# Patient Record
Sex: FEMALE
[De-identification: names, ages, dates, MRNs, and addresses within clinical notes are randomized per-mention and may not be internally consistent; named-entity substitution may affect disease eponyms.]

---

## 2024-06-14 ENCOUNTER — APPOINTMENT (OUTPATIENT)
Dept: ORTHOPEDIC SURGERY | Facility: CLINIC | Age: 79
End: 2024-06-14
Payer: MEDICARE

## 2024-06-14 DIAGNOSIS — M48.062 SPINAL STENOSIS, LUMBAR REGION WITH NEUROGENIC CLAUDICATION: ICD-10-CM

## 2024-06-14 DIAGNOSIS — M54.16 RADICULOPATHY, LUMBAR REGION: ICD-10-CM

## 2024-06-14 DIAGNOSIS — M43.16 SPONDYLOLISTHESIS, LUMBAR REGION: ICD-10-CM

## 2024-06-14 DIAGNOSIS — Z86.39 PERSONAL HISTORY OF OTHER ENDOCRINE, NUTRITIONAL AND METABOLIC DISEASE: ICD-10-CM

## 2024-06-14 PROBLEM — Z00.00 ENCOUNTER FOR PREVENTIVE HEALTH EXAMINATION: Status: ACTIVE | Noted: 2024-06-14

## 2024-06-14 PROCEDURE — 72110 X-RAY EXAM L-2 SPINE 4/>VWS: CPT

## 2024-06-14 PROCEDURE — 99204 OFFICE O/P NEW MOD 45 MIN: CPT

## 2024-06-14 NOTE — ASSESSMENT
[FreeTextEntry1] : 80 y/o female presenting with lower back pain x 2 years. She is referred by Dr. Li. Her pain radiates down her right leg and is constant. She also feels weaker in her right leg. She has been taking gabapentin 600mg BID without relief. She also has tried PT. She had multiple epidural and trigger point injections without relief.  She denies recent illness, fevers, numbness, saddle anesthesia, urinary retention or fecal incontinence. I independently reviewed lumbar MRI which showed severe spinal stenosis and L3-4 and L4-L5 Spondylolisthesis. We discussed treatment options including physical therapy, medications, spinal injections and surgery. Surgery would be a lumbar decompression and fusion. We discussed the risks and benefits. The risks include anesthesia, blood loss, need for blood transfusion, clots, stroke, myocardial infarct, chronic pain, loss of function, need for reoperation, nonunion, hardware failure, durotomy, infection and damage to neurovascular structures. The patient understands the risks. She asked several great questions all of which were answered. She elects to proceed with surgery.  She will be sent for an updated MRI for surgical planning.  We discussed red flag symptoms that would require emergent evaluation. She knows to call with any questions or concerns or if her symptoms acutely worsen.

## 2024-06-14 NOTE — HISTORY OF PRESENT ILLNESS
[de-identified] : 78 y/o female presenting with lower back pain x 2 years. Her pain radiates down her right leg and is constant. She also feels weaker in her right leg. She has been taking gabapentin 600mg BID without relief. She also has tried PT. She had multiple epidural and trigger point injections without relief.  She denies recent illness, fevers, numbness,  saddle anesthesia, urinary retention or fecal incontinence. She has MRI. She is accompanied by her grandson.

## 2024-06-14 NOTE — PHYSICAL EXAM
[de-identified] :  General: No acute distress, conversant, well-nourished. Head: Normocephalic, atraumatic Neck: trachea midline, FROM Heart: normotensive and normal rate and rhythm Lungs: No labored breathing Skin: No abrasions, no rashes, no edema Psych: Alert and oriented to person, place and time Extremities: no peripheral edema or digital cyanosis Gait: Normal gait. Can perform tandem gait.  Vascular: warm and well perfused distally, palpable distal pulses  MSK: Lumbar spine: No tenderness to palpation.  No step-off, no deformity.   NEURO EXAM: Sensation Left L2  -  2/2            Left L3  -  2/2 Left L4  -  2/2 Left L5  -  2/2 Left S1  -  2/2   Right L2  -  2/2            Right L3  -  2/2 Right L4  -  2/2 Right L5  -  2/2 Right S1  -  2/2   Motor: Left L2 (hip flexion)                            5/5                Left L3 (knee extension)                   5/5                Left L4 (ankle dorsiflexion)                 5/5                Left L5 (long toe extensor)                5/5                Left S1 (ankle plantar flexion)           5/5   Right L2 (hip flexion)                            4/5                Right L3 (knee extension)                   4/5                Right L4 (ankle dorsiflexion)                 5/5                Right L5 (long toe extensor)                5/5                Right S1 (ankle plantar flexion)           5/5   Reflexes: Normal and symmetric Negative clonus.  Down-going Babinski. [de-identified] : I ordered radiographs to evaluate the patient's symptoms.  Lumbar 4 view radiographs taken in the office today show no dislocation or fracture.  Lumbar spondylosis.  L3-4 and L4-L5 Spondylolisthesis.  I independently reviewed lumbar MRI which showed severe spinal stenosis and L3-4 and L4-L5 Spondylolisthesis.

## 2024-06-22 ENCOUNTER — NON-APPOINTMENT (OUTPATIENT)
Age: 79
End: 2024-06-22

## 2024-07-03 RX ORDER — POVIDONE-IODINE
1 FLAKES (GRAM) MISCELLANEOUS ONCE
Refills: 0 | Status: COMPLETED | OUTPATIENT
Start: 2024-07-08 | End: 2024-07-08

## 2024-07-03 NOTE — H&P ADULT - PROBLEM SELECTOR PLAN 1
Admit to Orthopaedic Service.  Presents today for elective L3-L5 TLIF  Pt medically stable and cleared for procedure today by Dr. Persaud

## 2024-07-03 NOTE — H&P ADULT - HISTORY OF PRESENT ILLNESS
79F c/o back pain x       Present for elective L3-L5 Transforaminal Lumbar Interbody Fusion  79F c/o back pain x chronic. Pt denies acute preceding trauma/injury. Pt has failed conservative management for her low back pain including multiple spinal epidurals (last one in 2023) and trigger point injections. She denies numbness/tingling of bilateral lower extremities. She uses a walker to get around at baseline. Denies DVT hx; denies CP, SOB, N/V, tactile fevers, calf pain.       Present for elective L3-L5 Transforaminal Lumbar Interbody Fusion     Grandson at bedside providing Danish translation

## 2024-07-03 NOTE — H&P ADULT - NSHPPHYSICALEXAM_GEN_ALL_CORE
MSK:  + decreased ROM 2/2 pain, lumbar spine       Remainder of exam per medical clearance note MSK:  + decreased ROM 2/2 pain, lumbar spine   Skin warm and well perfused, no visible wounds/erythema/ecchymoses  EHL/FHL/TA/GS 5/5 motor strength bilateral lower extremities   SLT in tact to distal bilateral lower extremities   DP pulses palpable bilaterally   Remainder of exam per medical clearance note

## 2024-07-03 NOTE — H&P ADULT - NSHPLABSRESULTS_GEN_ALL_CORE
Preop CBC, BMP, PT/PTT/INR  - WNL per medical clearance   H/H 10.5/33.3  Cr .79  Preop EKG - sinus rhythm - WNL per medical clearance    DOS

## 2024-07-05 VITALS
TEMPERATURE: 98 F | RESPIRATION RATE: 16 BRPM | HEIGHT: 59 IN | DIASTOLIC BLOOD PRESSURE: 71 MMHG | WEIGHT: 148.59 LBS | HEART RATE: 69 BPM | OXYGEN SATURATION: 99 % | SYSTOLIC BLOOD PRESSURE: 153 MMHG

## 2024-07-05 RX ORDER — SEMAGLUTIDE 1.34 MG/ML
1 INJECTION, SOLUTION SUBCUTANEOUS
Refills: 0 | DISCHARGE

## 2024-07-05 RX ORDER — EMPAGLIFLOZIN 25 MG/1
1 TABLET, FILM COATED ORAL
Refills: 0 | DISCHARGE

## 2024-07-05 RX ORDER — ASPIRIN 325 MG/1
1 TABLET, FILM COATED ORAL
Refills: 0 | DISCHARGE

## 2024-07-05 NOTE — ASU PATIENT PROFILE, ADULT - NSICDXPASTMEDICALHX_GEN_ALL_CORE_FT
PAST MEDICAL HISTORY:  Back pain     DM (diabetes mellitus)     HLD (hyperlipidemia)     HTN (hypertension)      PAST MEDICAL HISTORY:  Back pain     CAD (coronary artery disease)     DM (diabetes mellitus)     HLD (hyperlipidemia)     HTN (hypertension)

## 2024-07-05 NOTE — ASU PATIENT PROFILE, ADULT - NSICDXPASTSURGICALHX_GEN_ALL_CORE_FT
PAST SURGICAL HISTORY:  H/O: hysterectomy      PAST SURGICAL HISTORY:  Coronary stent patent x3 2017    H/O: hysterectomy

## 2024-07-05 NOTE — ASU PATIENT PROFILE, ADULT - FALL HARM RISK - HARM RISK INTERVENTIONS

## 2024-07-07 ENCOUNTER — TRANSCRIPTION ENCOUNTER (OUTPATIENT)
Age: 79
End: 2024-07-07

## 2024-07-08 ENCOUNTER — INPATIENT (INPATIENT)
Facility: HOSPITAL | Age: 79
LOS: 1 days | Discharge: HOME | End: 2024-07-10
Attending: STUDENT IN AN ORGANIZED HEALTH CARE EDUCATION/TRAINING PROGRAM | Admitting: STUDENT IN AN ORGANIZED HEALTH CARE EDUCATION/TRAINING PROGRAM
Payer: MEDICARE

## 2024-07-08 ENCOUNTER — APPOINTMENT (OUTPATIENT)
Dept: ORTHOPEDIC SURGERY | Facility: HOSPITAL | Age: 79
End: 2024-07-08

## 2024-07-08 DIAGNOSIS — Z95.5 PRESENCE OF CORONARY ANGIOPLASTY IMPLANT AND GRAFT: Chronic | ICD-10-CM

## 2024-07-08 DIAGNOSIS — Z90.710 ACQUIRED ABSENCE OF BOTH CERVIX AND UTERUS: Chronic | ICD-10-CM

## 2024-07-08 DIAGNOSIS — M54.9 DORSALGIA, UNSPECIFIED: ICD-10-CM

## 2024-07-08 LAB
BASE EXCESS BLDA CALC-SCNC: -2.9 MMOL/L — LOW (ref -2–3)
BASE EXCESS BLDA CALC-SCNC: -3.8 MMOL/L — LOW (ref -2–3)
BASE EXCESS BLDA CALC-SCNC: -5 MMOL/L — LOW (ref -2–3)
BLD GP AB SCN SERPL QL: NEGATIVE — SIGNIFICANT CHANGE UP
CA-I BLDA-SCNC: 1.17 MMOL/L — SIGNIFICANT CHANGE UP (ref 1.15–1.33)
CA-I BLDA-SCNC: 1.25 MMOL/L — SIGNIFICANT CHANGE UP (ref 1.15–1.33)
CA-I BLDA-SCNC: 1.27 MMOL/L — SIGNIFICANT CHANGE UP (ref 1.15–1.33)
CO2 BLDA-SCNC: 20 MMOL/L — SIGNIFICANT CHANGE UP (ref 19–24)
CO2 BLDA-SCNC: 22 MMOL/L — SIGNIFICANT CHANGE UP (ref 19–24)
CO2 BLDA-SCNC: 23 MMOL/L — SIGNIFICANT CHANGE UP (ref 19–24)
COHGB MFR BLDA: 1 % — SIGNIFICANT CHANGE UP
COHGB MFR BLDA: 1.1 % — SIGNIFICANT CHANGE UP
COHGB MFR BLDA: 1.2 % — SIGNIFICANT CHANGE UP
GAS PNL BLDA: SIGNIFICANT CHANGE UP
GLUCOSE BLDA-MCNC: 149 MG/DL — HIGH (ref 70–99)
GLUCOSE BLDA-MCNC: 151 MG/DL — HIGH (ref 70–99)
GLUCOSE BLDA-MCNC: 172 MG/DL — HIGH (ref 70–99)
GLUCOSE BLDC GLUCOMTR-MCNC: 149 MG/DL — HIGH (ref 70–99)
GLUCOSE BLDC GLUCOMTR-MCNC: 208 MG/DL — HIGH (ref 70–99)
GLUCOSE BLDC GLUCOMTR-MCNC: 251 MG/DL — HIGH (ref 70–99)
HCO3 BLDA-SCNC: 20 MMOL/L — LOW (ref 21–28)
HCO3 BLDA-SCNC: 21 MMOL/L — SIGNIFICANT CHANGE UP (ref 21–28)
HCO3 BLDA-SCNC: 22 MMOL/L — SIGNIFICANT CHANGE UP (ref 21–28)
HGB BLDA-MCNC: 7.9 G/DL — LOW (ref 11.7–16.1)
HGB BLDA-MCNC: 8.9 G/DL — LOW (ref 11.7–16.1)
HGB BLDA-MCNC: 9.1 G/DL — LOW (ref 11.7–16.1)
METHGB MFR BLDA: 0.5 % — SIGNIFICANT CHANGE UP
METHGB MFR BLDA: 0.5 % — SIGNIFICANT CHANGE UP
METHGB MFR BLDA: 0.8 % — SIGNIFICANT CHANGE UP
OXYHGB MFR BLDA: 97.9 % — HIGH (ref 90–95)
OXYHGB MFR BLDA: 98.1 % — HIGH (ref 90–95)
OXYHGB MFR BLDA: 98.3 % — HIGH (ref 90–95)
PCO2 BLDA: 33 MMHG — SIGNIFICANT CHANGE UP (ref 32–45)
PCO2 BLDA: 34 MMHG — SIGNIFICANT CHANGE UP (ref 32–45)
PCO2 BLDA: 37 MMHG — SIGNIFICANT CHANGE UP (ref 32–45)
PH BLDA: 7.38 — SIGNIFICANT CHANGE UP (ref 7.35–7.45)
PH BLDA: 7.38 — SIGNIFICANT CHANGE UP (ref 7.35–7.45)
PH BLDA: 7.39 — SIGNIFICANT CHANGE UP (ref 7.35–7.45)
PO2 BLDA: 365 MMHG — HIGH (ref 83–108)
PO2 BLDA: 374 MMHG — HIGH (ref 83–108)
PO2 BLDA: 390 MMHG — HIGH (ref 83–108)
POTASSIUM BLDA-SCNC: 4.2 MMOL/L — SIGNIFICANT CHANGE UP (ref 3.5–5.1)
POTASSIUM BLDA-SCNC: 4.4 MMOL/L — SIGNIFICANT CHANGE UP (ref 3.5–5.1)
POTASSIUM BLDA-SCNC: 4.6 MMOL/L — SIGNIFICANT CHANGE UP (ref 3.5–5.1)
RH IG SCN BLD-IMP: POSITIVE — SIGNIFICANT CHANGE UP
SAO2 % BLDA: 100 % — HIGH (ref 94–98)
SAO2 % BLDA: 99.6 % — HIGH (ref 94–98)
SAO2 % BLDA: 99.9 % — HIGH (ref 94–98)
SODIUM BLDA-SCNC: 136 MMOL/L — SIGNIFICANT CHANGE UP (ref 136–145)
SODIUM BLDA-SCNC: 137 MMOL/L — SIGNIFICANT CHANGE UP (ref 136–145)
SODIUM BLDA-SCNC: 137 MMOL/L — SIGNIFICANT CHANGE UP (ref 136–145)

## 2024-07-08 PROCEDURE — 22842 INSERT SPINE FIXATION DEVICE: CPT | Mod: AS

## 2024-07-08 PROCEDURE — 22853 INSJ BIOMECHANICAL DEVICE: CPT

## 2024-07-08 PROCEDURE — 22634 ARTHRD CMBN 1NTRSPC EA ADDL: CPT

## 2024-07-08 PROCEDURE — 22842 INSERT SPINE FIXATION DEVICE: CPT

## 2024-07-08 PROCEDURE — 61783 SCAN PROC SPINAL: CPT

## 2024-07-08 PROCEDURE — 22853 INSJ BIOMECHANICAL DEVICE: CPT | Mod: AS

## 2024-07-08 PROCEDURE — 22634 ARTHRD CMBN 1NTRSPC EA ADDL: CPT | Mod: AS

## 2024-07-08 PROCEDURE — 22633 ARTHRD CMBN 1NTRSPC LUMBAR: CPT | Mod: AS

## 2024-07-08 PROCEDURE — 20939 BONE MARROW ASPIR BONE GRFG: CPT

## 2024-07-08 PROCEDURE — 22633 ARTHRD CMBN 1NTRSPC LUMBAR: CPT

## 2024-07-08 DEVICE — IMPLANTABLE DEVICE: Type: IMPLANTABLE DEVICE | Status: FUNCTIONAL

## 2024-07-08 DEVICE — SET SCREW NXG: Type: IMPLANTABLE DEVICE | Status: FUNCTIONAL

## 2024-07-08 DEVICE — CAGE LORDOTIC FORZA XP 6.5X10X24MM: Type: IMPLANTABLE DEVICE | Status: FUNCTIONAL

## 2024-07-08 DEVICE — SURGIFOAM PAD 8CM X 12.5CM X 10MM (100): Type: IMPLANTABLE DEVICE | Status: FUNCTIONAL

## 2024-07-08 DEVICE — LUKENS BONE WAX 2.5G: Type: IMPLANTABLE DEVICE | Status: FUNCTIONAL

## 2024-07-08 DEVICE — HEAD POLY BODY TOP LOAD: Type: IMPLANTABLE DEVICE | Status: FUNCTIONAL

## 2024-07-08 DEVICE — SURGIFLO HEMOSTATIC MATRIX KIT: Type: IMPLANTABLE DEVICE | Status: FUNCTIONAL

## 2024-07-08 RX ORDER — GLIMEPIRIDE 2 MG/1
1 TABLET ORAL
Refills: 0 | DISCHARGE

## 2024-07-08 RX ORDER — DEXLANSOPRAZOLE 60 MG
1 CAPSULE, DELAYED RELEASE, BIPHASIC ORAL
Refills: 0 | DISCHARGE

## 2024-07-08 RX ORDER — LOSARTAN POTASSIUM 100 MG/1
1 TABLET, FILM COATED ORAL
Refills: 0 | DISCHARGE

## 2024-07-08 RX ORDER — PANTOPRAZOLE SODIUM 40 MG/10ML
40 INJECTION, POWDER, FOR SOLUTION INTRAVENOUS
Refills: 0 | Status: DISCONTINUED | OUTPATIENT
Start: 2024-07-08 | End: 2024-07-10

## 2024-07-08 RX ORDER — SENNOSIDES 8.6 MG
2 TABLET ORAL AT BEDTIME
Refills: 0 | Status: DISCONTINUED | OUTPATIENT
Start: 2024-07-08 | End: 2024-07-10

## 2024-07-08 RX ORDER — PREGABALIN 50 MG/1
50 CAPSULE ORAL THREE TIMES A DAY
Refills: 0 | Status: DISCONTINUED | OUTPATIENT
Start: 2024-07-08 | End: 2024-07-10

## 2024-07-08 RX ORDER — DEXTROSE 30 % IN WATER 30 %
25 VIAL (ML) INTRAVENOUS ONCE
Refills: 0 | Status: DISCONTINUED | OUTPATIENT
Start: 2024-07-08 | End: 2024-07-10

## 2024-07-08 RX ORDER — ONDANSETRON HYDROCHLORIDE 2 MG/ML
4 INJECTION INTRAMUSCULAR; INTRAVENOUS EVERY 6 HOURS
Refills: 0 | Status: DISCONTINUED | OUTPATIENT
Start: 2024-07-08 | End: 2024-07-10

## 2024-07-08 RX ORDER — ENOXAPARIN SODIUM 100 MG/ML
40 INJECTION SUBCUTANEOUS EVERY 24 HOURS
Refills: 0 | Status: DISCONTINUED | OUTPATIENT
Start: 2024-07-09 | End: 2024-07-10

## 2024-07-08 RX ORDER — LOSARTAN POTASSIUM 100 MG/1
25 TABLET, FILM COATED ORAL DAILY
Refills: 0 | Status: DISCONTINUED | OUTPATIENT
Start: 2024-07-09 | End: 2024-07-10

## 2024-07-08 RX ORDER — APREPITANT 125MG-80MG
40 KIT ORAL ONCE
Refills: 0 | Status: COMPLETED | OUTPATIENT
Start: 2024-07-08 | End: 2024-07-08

## 2024-07-08 RX ORDER — POLYETHYLENE GLYCOL 3350 1 G/G
17 POWDER ORAL DAILY
Refills: 0 | Status: DISCONTINUED | OUTPATIENT
Start: 2024-07-08 | End: 2024-07-10

## 2024-07-08 RX ORDER — INSULIN LISPRO 100 [IU]/ML
INJECTION, SOLUTION SUBCUTANEOUS
Refills: 0 | Status: DISCONTINUED | OUTPATIENT
Start: 2024-07-08 | End: 2024-07-10

## 2024-07-08 RX ORDER — ACETAMINOPHEN 325 MG
1000 TABLET ORAL ONCE
Refills: 0 | Status: COMPLETED | OUTPATIENT
Start: 2024-07-08 | End: 2024-07-08

## 2024-07-08 RX ORDER — DEXTROSE MONOHYDRATE AND SODIUM CHLORIDE 5; .3 G/100ML; G/100ML
1000 INJECTION, SOLUTION INTRAVENOUS
Refills: 0 | Status: DISCONTINUED | OUTPATIENT
Start: 2024-07-08 | End: 2024-07-10

## 2024-07-08 RX ORDER — CEFAZOLIN 10 G/1
2000 INJECTION, POWDER, FOR SOLUTION INTRAVENOUS EVERY 8 HOURS
Refills: 0 | Status: COMPLETED | OUTPATIENT
Start: 2024-07-08 | End: 2024-07-09

## 2024-07-08 RX ORDER — HYDROMORPHONE HCL 0.2 MG/ML
0.5 INJECTION, SOLUTION INTRAVENOUS
Refills: 0 | Status: DISCONTINUED | OUTPATIENT
Start: 2024-07-08 | End: 2024-07-10

## 2024-07-08 RX ORDER — ATORVASTATIN CALCIUM 20 MG/1
80 TABLET, FILM COATED ORAL AT BEDTIME
Refills: 0 | Status: DISCONTINUED | OUTPATIENT
Start: 2024-07-08 | End: 2024-07-10

## 2024-07-08 RX ORDER — ICOSAPENT ETHYL 0.5 G/1
2 CAPSULE, LIQUID FILLED ORAL
Refills: 0 | DISCHARGE

## 2024-07-08 RX ORDER — ACETAMINOPHEN 325 MG
1000 TABLET ORAL EVERY 8 HOURS
Refills: 0 | Status: DISCONTINUED | OUTPATIENT
Start: 2024-07-08 | End: 2024-07-10

## 2024-07-08 RX ORDER — GLUCAGON HYDROCHLORIDE 1 MG/ML
1 INJECTION, POWDER, FOR SOLUTION INTRAMUSCULAR; INTRAVENOUS; SUBCUTANEOUS ONCE
Refills: 0 | Status: DISCONTINUED | OUTPATIENT
Start: 2024-07-08 | End: 2024-07-10

## 2024-07-08 RX ORDER — METHOCARBAMOL 500 MG
500 TABLET ORAL EVERY 8 HOURS
Refills: 0 | Status: DISCONTINUED | OUTPATIENT
Start: 2024-07-08 | End: 2024-07-10

## 2024-07-08 RX ORDER — PANTOPRAZOLE SODIUM 40 MG/10ML
40 INJECTION, POWDER, FOR SOLUTION INTRAVENOUS
Refills: 0 | Status: DISCONTINUED | OUTPATIENT
Start: 2024-07-08 | End: 2024-07-08

## 2024-07-08 RX ORDER — ROSUVASTATIN CALCIUM 20 MG/1
1 TABLET ORAL
Refills: 0 | DISCHARGE

## 2024-07-08 RX ORDER — ONDANSETRON HYDROCHLORIDE 2 MG/ML
4 INJECTION INTRAMUSCULAR; INTRAVENOUS ONCE
Refills: 0 | Status: COMPLETED | OUTPATIENT
Start: 2024-07-08 | End: 2024-07-08

## 2024-07-08 RX ORDER — ASPIRIN 325 MG/1
81 TABLET, FILM COATED ORAL DAILY
Refills: 0 | Status: DISCONTINUED | OUTPATIENT
Start: 2024-07-09 | End: 2024-07-10

## 2024-07-08 RX ORDER — ATORVASTATIN CALCIUM 20 MG/1
80 TABLET, FILM COATED ORAL AT BEDTIME
Refills: 0 | Status: DISCONTINUED | OUTPATIENT
Start: 2024-07-08 | End: 2024-07-08

## 2024-07-08 RX ORDER — OXYCODONE HYDROCHLORIDE 100 MG/5ML
5 SOLUTION ORAL EVERY 4 HOURS
Refills: 0 | Status: DISCONTINUED | OUTPATIENT
Start: 2024-07-08 | End: 2024-07-10

## 2024-07-08 RX ORDER — HYDROMORPHONE HCL 0.2 MG/ML
0.5 INJECTION, SOLUTION INTRAVENOUS EVERY 4 HOURS
Refills: 0 | Status: DISCONTINUED | OUTPATIENT
Start: 2024-07-08 | End: 2024-07-10

## 2024-07-08 RX ORDER — DEXTROSE MONOHYDRATE 100 MG/ML
125 INJECTION, SOLUTION INTRAVENOUS ONCE
Refills: 0 | Status: DISCONTINUED | OUTPATIENT
Start: 2024-07-08 | End: 2024-07-10

## 2024-07-08 RX ORDER — DEXTROSE 30 % IN WATER 30 %
15 VIAL (ML) INTRAVENOUS ONCE
Refills: 0 | Status: DISCONTINUED | OUTPATIENT
Start: 2024-07-08 | End: 2024-07-10

## 2024-07-08 RX ORDER — METFORMIN HYDROCHLORIDE 850 MG/1
1 TABLET, FILM COATED ORAL
Refills: 0 | DISCHARGE

## 2024-07-08 RX ORDER — DEXTROSE 30 % IN WATER 30 %
12.5 VIAL (ML) INTRAVENOUS ONCE
Refills: 0 | Status: DISCONTINUED | OUTPATIENT
Start: 2024-07-08 | End: 2024-07-10

## 2024-07-08 RX ADMIN — PREGABALIN 50 MILLIGRAM(S): 50 CAPSULE ORAL at 21:32

## 2024-07-08 RX ADMIN — Medication 500 MILLIGRAM(S): at 21:32

## 2024-07-08 RX ADMIN — HYDROMORPHONE HCL 0.5 MILLIGRAM(S): 0.2 INJECTION, SOLUTION INTRAVENOUS at 13:55

## 2024-07-08 RX ADMIN — Medication 1000 MILLIGRAM(S): at 21:33

## 2024-07-08 RX ADMIN — Medication 2 TABLET(S): at 21:33

## 2024-07-08 RX ADMIN — APREPITANT 40 MILLIGRAM(S): KIT at 06:35

## 2024-07-08 RX ADMIN — ATORVASTATIN CALCIUM 80 MILLIGRAM(S): 20 TABLET, FILM COATED ORAL at 21:32

## 2024-07-08 RX ADMIN — INSULIN LISPRO 6: 100 INJECTION, SOLUTION SUBCUTANEOUS at 22:03

## 2024-07-08 RX ADMIN — Medication 1000 MILLIGRAM(S): at 06:35

## 2024-07-08 RX ADMIN — Medication 1000 MILLIGRAM(S): at 22:33

## 2024-07-08 RX ADMIN — INSULIN LISPRO 4: 100 INJECTION, SOLUTION SUBCUTANEOUS at 15:28

## 2024-07-08 RX ADMIN — ONDANSETRON HYDROCHLORIDE 4 MILLIGRAM(S): 2 INJECTION INTRAMUSCULAR; INTRAVENOUS at 18:48

## 2024-07-08 RX ADMIN — CEFAZOLIN 100 MILLIGRAM(S): 10 INJECTION, POWDER, FOR SOLUTION INTRAVENOUS at 19:51

## 2024-07-08 RX ADMIN — HYDROMORPHONE HCL 0.5 MILLIGRAM(S): 0.2 INJECTION, SOLUTION INTRAVENOUS at 14:10

## 2024-07-08 RX ADMIN — Medication 1 APPLICATION(S): at 06:30

## 2024-07-08 RX ADMIN — Medication 1 APPLICATION(S): at 06:48

## 2024-07-08 RX ADMIN — ONDANSETRON HYDROCHLORIDE 4 MILLIGRAM(S): 2 INJECTION INTRAMUSCULAR; INTRAVENOUS at 13:55

## 2024-07-08 NOTE — BRIEF OPERATIVE NOTE - NSICDXBRIEFPROCEDURE_GEN_ALL_CORE_FT
PROCEDURES:  Fusion, spine, lumbar, interbody, 1-2 levels, transforaminal approach 08-Jul-2024 19:23:22 L3-L5 Millie Hernandez

## 2024-07-08 NOTE — PHYSICAL THERAPY INITIAL EVALUATION ADULT - PERTINENT HX OF CURRENT PROBLEM, REHAB EVAL
Pt is an 78 yo female Pt is an 80 yo female c/o back pain x chronic. Pt denies acute preceding trauma/injury. Pt has failed conservative management for her low back pain including multiple spinal epidurals (last one in 2023) and trigger point injections. She denies numbness/tingling of bilateral lower extremities. She uses a walker to get around at baseline; s/p L3-L5 TLIF on 7/8/24.

## 2024-07-08 NOTE — PROGRESS NOTE ADULT - SUBJECTIVE AND OBJECTIVE BOX
Ortho Post Op Check    Procedure: L3-5 TLIF  Surgeon: Kt    Pt comfortable without complaints, incisional burning pain controlled  Denies CP, SOB, N/V, numbness/tingling     Vital Signs Last 24 Hrs  T(C): 36.3 (07-08-24 @ 16:05), Max: 36.4 (07-08-24 @ 13:16)  T(F): 97.3 (07-08-24 @ 16:05), Max: 97.6 (07-08-24 @ 13:16)  HR: 70 (07-08-24 @ 16:05) (68 - 92)  BP: 110/65 (07-08-24 @ 16:05) (105/53 - 159/74)  BP(mean): 78 (07-08-24 @ 15:26) (77 - 106)  RR: 17 (07-08-24 @ 16:05) (14 - 17)  SpO2: 96% (07-08-24 @ 16:05) (94% - 97%)  I&O's Summary    08 Jul 2024 07:01  -  08 Jul 2024 17:02  --------------------------------------------------------  IN: 220 mL / OUT: 330 mL / NET: -110 mL        General: Pt Alert and oriented, NAD  Aquacel DSG C/D/I  Pulses: 2+  Sensation: SILT  Motor: 5/5 Quad/Ham/EHL/FHL/TA/GS              A/P: 79yFemale POD#0 s/p L3-5 TLIF  - Stable  - Pain Control  - DVT ppx: SCDs  - Post op abx: ancef  - PT, WBS: WBAT/PT  - fu AM labs    Ortho Pager [phone]

## 2024-07-08 NOTE — PHYSICAL THERAPY INITIAL EVALUATION ADULT - GENERAL OBSERVATIONS, REHAB EVAL
Pt received semi supine in bed with +IV, +b/l SCDs, +Hemovac x 1, +Bello, lower back dressing C/D/I, NAD, daughter present. Pt left as found, NAD, call bell in reach, +bed alarm, line and drain intact, daughter present, RN Cezar ayala.

## 2024-07-08 NOTE — PHYSICAL THERAPY INITIAL EVALUATION ADULT - ADDITIONAL COMMENTS
Pt lives with spouse in an apt with elevator. Prior to admission, pt ambulated with rollator. Pt has shower chair and commode at home. Pt's daughter work as pt's CDPAP for 8 hours x 5 days.

## 2024-07-08 NOTE — PHYSICAL THERAPY INITIAL EVALUATION ADULT - SENSORY TESTS
Pt reports lower back pain radiate down to R buttlock before surgery and pain at R buttlock is gone now, but pt complains lower back pain radiate down to L buttlock now, Ortho team Dr Garrido is notified

## 2024-07-08 NOTE — PHYSICAL THERAPY INITIAL EVALUATION ADULT - GAIT DEVIATIONS NOTED, PT EVAL
slightly unsteady gait, no lose of balance, decreased heel strike and hip flexion bilaterally./decreased belinda/increased time in double stance/decreased step length

## 2024-07-08 NOTE — PHYSICAL THERAPY INITIAL EVALUATION ADULT - MANUAL MUSCLE TESTING RESULTS, REHAB EVAL
b/l UEs ~4+/5 t/o, RLE~4+/5 t/o except R hip flexion~4-/5, LLE~4+/5 t/o except L hip flexion~3/5 due to pain

## 2024-07-08 NOTE — PHYSICAL THERAPY INITIAL EVALUATION ADULT - IMPAIRMENTS CONTRIBUTING TO GAIT DEVIATIONS, PT EVAL
Ambulation is limited secondary pt complains nausea and dizziness./impaired balance/pain/impaired postural control/decreased strength

## 2024-07-09 ENCOUNTER — TRANSCRIPTION ENCOUNTER (OUTPATIENT)
Age: 79
End: 2024-07-09

## 2024-07-09 LAB
A1C WITH ESTIMATED AVERAGE GLUCOSE RESULT: 6.9 % — HIGH (ref 4–5.6)
ANION GAP SERPL CALC-SCNC: 11 MMOL/L — SIGNIFICANT CHANGE UP (ref 5–17)
BASOPHILS # BLD AUTO: 0.03 K/UL — SIGNIFICANT CHANGE UP (ref 0–0.2)
BASOPHILS NFR BLD AUTO: 0.3 % — SIGNIFICANT CHANGE UP (ref 0–2)
BUN SERPL-MCNC: 15 MG/DL — SIGNIFICANT CHANGE UP (ref 7–23)
CALCIUM SERPL-MCNC: 8.8 MG/DL — SIGNIFICANT CHANGE UP (ref 8.4–10.5)
CHLORIDE SERPL-SCNC: 104 MMOL/L — SIGNIFICANT CHANGE UP (ref 96–108)
CO2 SERPL-SCNC: 24 MMOL/L — SIGNIFICANT CHANGE UP (ref 22–31)
CREAT SERPL-MCNC: 0.87 MG/DL — SIGNIFICANT CHANGE UP (ref 0.5–1.3)
EGFR: 67 ML/MIN/1.73M2 — SIGNIFICANT CHANGE UP
EOSINOPHIL # BLD AUTO: 0 K/UL — SIGNIFICANT CHANGE UP (ref 0–0.5)
EOSINOPHIL NFR BLD AUTO: 0 % — SIGNIFICANT CHANGE UP (ref 0–6)
ESTIMATED AVERAGE GLUCOSE: 151 MG/DL — HIGH (ref 68–114)
GLUCOSE BLDC GLUCOMTR-MCNC: 176 MG/DL — HIGH (ref 70–99)
GLUCOSE BLDC GLUCOMTR-MCNC: 209 MG/DL — HIGH (ref 70–99)
GLUCOSE BLDC GLUCOMTR-MCNC: 241 MG/DL — HIGH (ref 70–99)
GLUCOSE BLDC GLUCOMTR-MCNC: 360 MG/DL — HIGH (ref 70–99)
GLUCOSE SERPL-MCNC: 203 MG/DL — HIGH (ref 70–99)
HCT VFR BLD CALC: 26.8 % — LOW (ref 34.5–45)
HGB BLD-MCNC: 8.6 G/DL — LOW (ref 11.5–15.5)
IMM GRANULOCYTES NFR BLD AUTO: 0.4 % — SIGNIFICANT CHANGE UP (ref 0–0.9)
LYMPHOCYTES # BLD AUTO: 1.11 K/UL — SIGNIFICANT CHANGE UP (ref 1–3.3)
LYMPHOCYTES # BLD AUTO: 12.1 % — LOW (ref 13–44)
MCHC RBC-ENTMCNC: 28.6 PG — SIGNIFICANT CHANGE UP (ref 27–34)
MCHC RBC-ENTMCNC: 32.1 GM/DL — SIGNIFICANT CHANGE UP (ref 32–36)
MCV RBC AUTO: 89 FL — SIGNIFICANT CHANGE UP (ref 80–100)
MONOCYTES # BLD AUTO: 0.91 K/UL — HIGH (ref 0–0.9)
MONOCYTES NFR BLD AUTO: 9.9 % — SIGNIFICANT CHANGE UP (ref 2–14)
NEUTROPHILS # BLD AUTO: 7.07 K/UL — SIGNIFICANT CHANGE UP (ref 1.8–7.4)
NEUTROPHILS NFR BLD AUTO: 77.3 % — HIGH (ref 43–77)
NRBC # BLD: 0 /100 WBCS — SIGNIFICANT CHANGE UP (ref 0–0)
PLATELET # BLD AUTO: 141 K/UL — LOW (ref 150–400)
POTASSIUM SERPL-MCNC: 4.2 MMOL/L — SIGNIFICANT CHANGE UP (ref 3.5–5.3)
POTASSIUM SERPL-SCNC: 4.2 MMOL/L — SIGNIFICANT CHANGE UP (ref 3.5–5.3)
RBC # BLD: 3.01 M/UL — LOW (ref 3.8–5.2)
RBC # FLD: 13.2 % — SIGNIFICANT CHANGE UP (ref 10.3–14.5)
SODIUM SERPL-SCNC: 139 MMOL/L — SIGNIFICANT CHANGE UP (ref 135–145)
WBC # BLD: 9.16 K/UL — SIGNIFICANT CHANGE UP (ref 3.8–10.5)
WBC # FLD AUTO: 9.16 K/UL — SIGNIFICANT CHANGE UP (ref 3.8–10.5)

## 2024-07-09 RX ORDER — METFORMIN HYDROCHLORIDE 850 MG/1
1000 TABLET, FILM COATED ORAL
Refills: 0 | Status: DISCONTINUED | OUTPATIENT
Start: 2024-07-09 | End: 2024-07-10

## 2024-07-09 RX ADMIN — Medication 500 MILLIGRAM(S): at 21:05

## 2024-07-09 RX ADMIN — Medication 500 MILLIGRAM(S): at 05:00

## 2024-07-09 RX ADMIN — PREGABALIN 50 MILLIGRAM(S): 50 CAPSULE ORAL at 21:04

## 2024-07-09 RX ADMIN — ONDANSETRON HYDROCHLORIDE 4 MILLIGRAM(S): 2 INJECTION INTRAMUSCULAR; INTRAVENOUS at 11:45

## 2024-07-09 RX ADMIN — PREGABALIN 50 MILLIGRAM(S): 50 CAPSULE ORAL at 14:15

## 2024-07-09 RX ADMIN — Medication 1000 MILLIGRAM(S): at 21:04

## 2024-07-09 RX ADMIN — Medication 1000 MILLIGRAM(S): at 06:00

## 2024-07-09 RX ADMIN — CEFAZOLIN 100 MILLIGRAM(S): 10 INJECTION, POWDER, FOR SOLUTION INTRAVENOUS at 04:00

## 2024-07-09 RX ADMIN — POLYETHYLENE GLYCOL 3350 17 GRAM(S): 1 POWDER ORAL at 11:46

## 2024-07-09 RX ADMIN — ONDANSETRON HYDROCHLORIDE 4 MILLIGRAM(S): 2 INJECTION INTRAMUSCULAR; INTRAVENOUS at 05:01

## 2024-07-09 RX ADMIN — ONDANSETRON HYDROCHLORIDE 4 MILLIGRAM(S): 2 INJECTION INTRAMUSCULAR; INTRAVENOUS at 17:56

## 2024-07-09 RX ADMIN — Medication 2 TABLET(S): at 21:06

## 2024-07-09 RX ADMIN — Medication 1000 MILLIGRAM(S): at 05:00

## 2024-07-09 RX ADMIN — METFORMIN HYDROCHLORIDE 1000 MILLIGRAM(S): 850 TABLET, FILM COATED ORAL at 19:31

## 2024-07-09 RX ADMIN — ATORVASTATIN CALCIUM 80 MILLIGRAM(S): 20 TABLET, FILM COATED ORAL at 21:04

## 2024-07-09 RX ADMIN — PANTOPRAZOLE SODIUM 40 MILLIGRAM(S): 40 INJECTION, POWDER, FOR SOLUTION INTRAVENOUS at 05:01

## 2024-07-09 RX ADMIN — Medication 1000 MILLIGRAM(S): at 22:00

## 2024-07-09 RX ADMIN — ENOXAPARIN SODIUM 40 MILLIGRAM(S): 100 INJECTION SUBCUTANEOUS at 21:05

## 2024-07-09 RX ADMIN — INSULIN LISPRO 4: 100 INJECTION, SOLUTION SUBCUTANEOUS at 16:45

## 2024-07-09 RX ADMIN — Medication 1000 MILLIGRAM(S): at 14:16

## 2024-07-09 RX ADMIN — ASPIRIN 81 MILLIGRAM(S): 325 TABLET, FILM COATED ORAL at 11:45

## 2024-07-09 RX ADMIN — INSULIN LISPRO 2: 100 INJECTION, SOLUTION SUBCUTANEOUS at 07:34

## 2024-07-09 RX ADMIN — INSULIN LISPRO 4: 100 INJECTION, SOLUTION SUBCUTANEOUS at 12:08

## 2024-07-09 RX ADMIN — Medication 500 MILLIGRAM(S): at 14:15

## 2024-07-09 RX ADMIN — PREGABALIN 50 MILLIGRAM(S): 50 CAPSULE ORAL at 05:01

## 2024-07-09 RX ADMIN — INSULIN LISPRO 10: 100 INJECTION, SOLUTION SUBCUTANEOUS at 22:37

## 2024-07-09 NOTE — OCCUPATIONAL THERAPY INITIAL EVALUATION ADULT - MODALITIES TREATMENT COMMENTS
Pt able to perform bed mobility, requiring Min Ax1 to clear BLE over surface of bed. Pt able to maintain sitting position at EOB, unsupported. Pt performed LB dressing while sitting EOB, requiring Min Ax1 with hand over hand assist for use of hip kit. Pt performed sit<->stand and ambulation in room/hallway with CGAx1 using RW, demo decreased step length and unsteadiness t/o, however no buckling or LOB observed.

## 2024-07-09 NOTE — DISCHARGE NOTE PROVIDER - NSDCACTIVITY_GEN_ALL_CORE
Do not drive or operate machinery/Showering allowed/Stairs allowed/No heavy lifting/straining/Activity as tolerated

## 2024-07-09 NOTE — OCCUPATIONAL THERAPY INITIAL EVALUATION ADULT - PERTINENT HX OF CURRENT PROBLEM, REHAB EVAL
Pt is an 80 yo female c/o back pain x chronic. Pt denies acute preceding trauma/injury. Pt has failed conservative management for her low back pain including multiple spinal epidurals (last one in 2023) and trigger point injections. She denies numbness/tingling of bilateral lower extremities. She uses a walker to get around at baseline; s/p L3-L5 TLIF on 7/8/24.

## 2024-07-09 NOTE — DISCHARGE NOTE PROVIDER - CARE PROVIDER_API CALL
Pipo Meraz Tashi  Orthopaedic Surgery  [address] [place] [address], [address]  [place] [address]  Phone: [phone]  Fax: [phone]  Follow Up Time:

## 2024-07-09 NOTE — DISCHARGE NOTE PROVIDER - NSDCMRMEDTOKEN_GEN_ALL_CORE_FT
Aspir 81 oral delayed release tablet: 1 tab(s) orally once a day  Dexilant 60 mg oral delayed release capsule: 1 cap(s) orally once a day  glimepiride 4 mg oral tablet: 1 tab(s) orally 2 times a day  Jardiance 10 mg oral tablet: 1 tab(s) orally once a day  losartan 25 mg oral tablet: 1 tab(s) orally once a day  metFORMIN 1000 mg oral tablet: 1 tab(s) orally 2 times a day  rosuvastatin 20 mg oral tablet: 1 tab(s) orally once a day  Rybelsus 3 mg oral tablet: 1 tab(s) orally once a day  Vascepa 1 g oral capsule: 2 cap(s) orally 2 times a day   acetaminophen 500 mg oral tablet: 2 tab(s) orally every 8 hours  Aspir 81 oral delayed release tablet: 1 tab(s) orally once a day  Dexilant 60 mg oral delayed release capsule: 1 cap(s) orally once a day  glimepiride 4 mg oral tablet: 1 tab(s) orally 2 times a day  Jardiance 10 mg oral tablet: 1 tab(s) orally once a day  losartan 25 mg oral tablet: 1 tab(s) orally once a day  metFORMIN 1000 mg oral tablet: 1 tab(s) orally 2 times a day  methocarbamol 500 mg oral tablet: 1 tab(s) orally every 8 hours  oxyCODONE 5 mg oral tablet: 1 tab(s) orally every 4 hours as needed for Severe Pain (7 - 10) MDD: 6  pregabalin 50 mg oral capsule: 1 cap(s) orally 3 times a day MDD: 3  rosuvastatin 20 mg oral tablet: 1 tab(s) orally once a day  Rybelsus 3 mg oral tablet: 1 tab(s) orally once a day  senna leaf extract oral tablet: 2 tab(s) orally once a day (at bedtime)  Vascepa 1 g oral capsule: 2 cap(s) orally 2 times a day

## 2024-07-09 NOTE — DISCHARGE NOTE PROVIDER - NSDCFUADDINST_GEN_ALL_CORE_FT
ACTIVITY:   - No extreme bending, extending, turning, twisting, or straining. No strenuous activity, heavy lifting, driving or returning to work until cleared by your surgeon.     DRESSING/SHOWERING:   (AQUACEL – brown gel dressing)   - You may shower, your dressing is water-resistant. Do not soak in bathtubs. Remove dressing after postop day 7, then leave incision open to air. You may do this yourself (simply peel it off), or you may ask for assistance from your visiting nurse. Keep your incision clean and dry. Do not pick at your incision. Do not apply creams, ointments or oils to your incision until cleared by your surgeon. Do not soak your incision in sitting water (ie tubs, pools, lakes, etc.) until cleared by your surgeon. Do not scrub the incision – instead, allow soap and water to flow over the incision and then pat it dry with a clean towel.     MEDICATION/ANTICOAGULATION:   - You have been prescribed medications for pain:     - Tylenol (Acetaminophen) for mild to moderate pain. Do not exceed 3,000mg daily.     - For more severe pain, you may continue to take the Tylenol with the addition of narcotic pain medication. Take this medication as prescribed. Do not take more than prescribed. Note that this medication may cause drowsiness or dizziness. Do not operate machinery. This medication may cause constipation.   - If you have been prescribed a muscle relaxer, take this medication as needed for muscle spasm. Follow instructions on bottle.   - Try to have regular bowel movements. Take stool softener or laxative if necessary. You may wish to take Miralax daily until you have regular bowel movements.    - Do not take antiinflammatories (Aleve, Advil, Naproxen, Ibuprofen, etc.) until cleared by your surgeon. Tylenol is not an anti-inflammatory and okay to take (see above).   - If you have a pain management physician, please follow-up with them postoperatively.    - If you experience any negative side effects of your medications, please call your surgeon's office to discuss.   - You have been prescribed Lovenox for anticoagulation, you may resume your at home dose of Aspirin as well.    FOLLOW UP:   - Call to schedule an appt with Dr. Meraz for follow up.    - Please follow-up with your primary care physician or any other specialist you see postoperatively, if needed.    - Contact your doctor or go to the emergency room if you experience: fever greater than 101.5, chills, chest pain, difficulty breathing, redness or excessive drainage around the incision, other concerns.

## 2024-07-09 NOTE — OCCUPATIONAL THERAPY INITIAL EVALUATION ADULT - ADDITIONAL COMMENTS
PTA, pt able to perform mobility/ADLs independently. Pt uses rollator for ambulation. Pt has tub-shower. Pt denies any additional AD/AE. Pt's daughter works as pt's CDPAP for 8 hours x 5 days.

## 2024-07-09 NOTE — OCCUPATIONAL THERAPY INITIAL EVALUATION ADULT - GENERAL OBSERVATIONS, REHAB EVAL
OT IE completed. Orders received, chart reviewed, pt cleared for OT by ELÍAS García. Pt received semi supine in bed, NAD, +heplock, +hemovac, +tele, +spinal dressing C/D/I. Pt A&Ox4, agreeable to OT, and tolerated session well.

## 2024-07-09 NOTE — PROGRESS NOTE ADULT - SUBJECTIVE AND OBJECTIVE BOX
Ortho Note    Pt hx taken from her daughter who was at the bedside. Pt seen during morning rounds. Pt was comfortable in bed. Pt is tolerating her diet well, but has not had a BM yet. Pt also denies abdominal tenderness. Pt looking forward to walking with PT today.  Denies CP, SOB, N/V, numbness/tingling/weakness.    Vital Signs Last 24 Hrs  T(C): 36.4 (07-09-24 @ 08:40), Max: 36.4 (07-09-24 @ 08:40)  T(F): 97.5 (07-09-24 @ 08:40), Max: 97.5 (07-09-24 @ 08:40)  HR: 81 (07-09-24 @ 13:08) (71 - 81)  BP: 120/69 (07-09-24 @ 10:00) (107/62 - 120/69)  BP(mean): 77 (07-09-24 @ 08:40) (77 - 77)  RR: 18 (07-09-24 @ 13:08) (16 - 18)  SpO2: 94% (07-09-24 @ 13:08) (93% - 96%)  I&O's Summary    08 Jul 2024 07:01  -  09 Jul 2024 07:00  --------------------------------------------------------  IN: 1910 mL / OUT: 1825 mL / NET: 85 mL    09 Jul 2024 07:01  -  09 Jul 2024 13:33  --------------------------------------------------------  IN: 360 mL / OUT: 1800 mL / NET: -1440 mL        General: Pt Alert and oriented, NAD  DSG C/D/I - Aqaucel  Pulses: 2+ DP. Skin warm, dry, well perfused bilaterally. Negative angela sign.  Motor: EHL/FHL/TA/GS 5/5 bilaterally                          8.6    9.16  )-----------( 141      ( 09 Jul 2024 05:30 )             26.8     07-09    139  |  104  |  15  ----------------------------<  203<H>  4.2   |  24  |  0.87    Ca    8.8      09 Jul 2024 05:30        A/P: 80 y/o Female POD#1 s/p L3-L5 TLIF with Dr. Kt Greene. Continue to appreciate medicine recommendations.   - Restart at home metformin due to elevated blood glucose levels  - Pain Control  - DVT ppx: SCDs, Lovenox tonight, ASA  - D/c LOLA raphael  - PT, WBS: WBAT  - OOB/IS  - Bowel regimen  - Dispo: Home pending PT clearance      Ortho Pager [phone] Ortho Note    Pt hx taken from her daughter who was at the bedside. Pt seen during morning rounds. Pt was comfortable in bed. Pt is tolerating her diet well, but has not had a BM yet. Pt also denies abdominal tenderness. Pt looking forward to walking with PT today.  Denies CP, SOB, N/V, numbness/tingling/weakness.    Vital Signs Last 24 Hrs  T(C): 36.4 (07-09-24 @ 08:40), Max: 36.4 (07-09-24 @ 08:40)  T(F): 97.5 (07-09-24 @ 08:40), Max: 97.5 (07-09-24 @ 08:40)  HR: 81 (07-09-24 @ 13:08) (71 - 81)  BP: 120/69 (07-09-24 @ 10:00) (107/62 - 120/69)  BP(mean): 77 (07-09-24 @ 08:40) (77 - 77)  RR: 18 (07-09-24 @ 13:08) (16 - 18)  SpO2: 94% (07-09-24 @ 13:08) (93% - 96%)  I&O's Summary    08 Jul 2024 07:01  -  09 Jul 2024 07:00  --------------------------------------------------------  IN: 1910 mL / OUT: 1825 mL / NET: 85 mL    09 Jul 2024 07:01  -  09 Jul 2024 13:33  --------------------------------------------------------  IN: 360 mL / OUT: 1800 mL / NET: -1440 mL        General: Pt Alert and oriented, NAD  DSG C/D/I - Aqaucel  Pulses: 2+ DP. Skin warm, dry, well perfused bilaterally. Negative angela sign.  Motor: EHL/FHL/TA/GS 5/5 bilaterally                          8.6    9.16  )-----------( 141      ( 09 Jul 2024 05:30 )             26.8     07-09    139  |  104  |  15  ----------------------------<  203<H>  4.2   |  24  |  0.87    Ca    8.8      09 Jul 2024 05:30        A/P: 80 y/o Female POD#1 s/p L3-L5 TLIF with Dr. Meraz  - Jc. Continue to appreciate medicine recommendations.   - due to hx of stent and age, pt was transferred to telemetry unit for cardiac monitoring today  - Restart at home metformin due to elevated blood glucose levels  - Pain Control  - DVT ppx: SCDs, Lovenox tonight, ASA  - D/c LOLA raphael  - PT, WBS: WBAT  - OOB/IS  - Bowel regimen  - Dispo: Home pending PT clearance      Ortho Pager [phone]

## 2024-07-09 NOTE — DISCHARGE NOTE PROVIDER - HOSPITAL COURSE
Admitted 7/8   Surgery L3-L5 Total lumbar interbody fusion   Elvi-op Antibiotics Ancef  Pain control  DVT prophylaxis: Lovenox   OOB/Physical Therapy

## 2024-07-09 NOTE — PROGRESS NOTE ADULT - SUBJECTIVE AND OBJECTIVE BOX
Ortho Note    Pt comfortable without complaints, pain controlled. Left buttock/LLE pain during PT yesterday.  Denies CP, SOB, N/V, numbness/tingling     Vital Signs Last 24 Hrs  T(C): 36.8 (07-09-24 @ 00:46), Max: 36.8 (07-09-24 @ 00:46)  T(F): 98.2 (07-09-24 @ 00:46), Max: 98.2 (07-09-24 @ 00:46)  HR: 73 (07-09-24 @ 00:46) (73 - 73)  BP: 106/55 (07-09-24 @ 00:46) (106/55 - 106/55)  BP(mean): --  RR: 18 (07-09-24 @ 00:46) (18 - 18)  SpO2: 95% (07-09-24 @ 00:46) (95% - 95%)  I&O's Summary    08 Jul 2024 07:01  -  09 Jul 2024 05:38  --------------------------------------------------------  IN: 1560 mL / OUT: 1230 mL / NET: 330 mL        General: Pt Alert and oriented, NAD  DSG C/D/I  Pulses: 2+  Sensation: SILT  Motor: 5/5 Quad/Ham/EHL/FHL/TA/GS  HV x1: 100/130            A/P: 79yFemale POD# 1 s/p  L3-5 TLIF  - Stable  - Pain Control  - DVT ppx: SCDs, lovenox tonight  - PT, WBS: WBAT  - Pending PT  - Continue drains  -Remove raphael and TOV today    Ortho Pager [phone]

## 2024-07-09 NOTE — DISCHARGE NOTE PROVIDER - NSDCHHCONTACT_GEN_ALL_CORE_FT
As certified below, I, or a nurse practitioner or physician assistant working with me, had a face-to-face encounter that meets the physician face-to-face encounter requirements. Deteriorating patient status - Patient was clinically upgraded due to deteriorating patient status.

## 2024-07-09 NOTE — DISCHARGE NOTE PROVIDER - NSDCCPTREATMENT_GEN_ALL_CORE_FT
PRINCIPAL PROCEDURE  Procedure: Fusion, spine, lumbar, interbody, 1-2 levels, transforaminal approach  Findings and Treatment: L3-L5

## 2024-07-10 ENCOUNTER — TRANSCRIPTION ENCOUNTER (OUTPATIENT)
Age: 79
End: 2024-07-10

## 2024-07-10 VITALS
SYSTOLIC BLOOD PRESSURE: 116 MMHG | RESPIRATION RATE: 18 BRPM | TEMPERATURE: 99 F | HEART RATE: 88 BPM | DIASTOLIC BLOOD PRESSURE: 56 MMHG | OXYGEN SATURATION: 96 %

## 2024-07-10 LAB
ANION GAP SERPL CALC-SCNC: 10 MMOL/L — SIGNIFICANT CHANGE UP (ref 5–17)
BASOPHILS # BLD AUTO: 0.03 K/UL — SIGNIFICANT CHANGE UP (ref 0–0.2)
BASOPHILS NFR BLD AUTO: 0.3 % — SIGNIFICANT CHANGE UP (ref 0–2)
BUN SERPL-MCNC: 15 MG/DL — SIGNIFICANT CHANGE UP (ref 7–23)
CALCIUM SERPL-MCNC: 9.1 MG/DL — SIGNIFICANT CHANGE UP (ref 8.4–10.5)
CHLORIDE SERPL-SCNC: 105 MMOL/L — SIGNIFICANT CHANGE UP (ref 96–108)
CO2 SERPL-SCNC: 22 MMOL/L — SIGNIFICANT CHANGE UP (ref 22–31)
CREAT SERPL-MCNC: 0.81 MG/DL — SIGNIFICANT CHANGE UP (ref 0.5–1.3)
EGFR: 73 ML/MIN/1.73M2 — SIGNIFICANT CHANGE UP
EOSINOPHIL # BLD AUTO: 0.01 K/UL — SIGNIFICANT CHANGE UP (ref 0–0.5)
EOSINOPHIL NFR BLD AUTO: 0.1 % — SIGNIFICANT CHANGE UP (ref 0–6)
GLUCOSE BLDC GLUCOMTR-MCNC: 214 MG/DL — HIGH (ref 70–99)
GLUCOSE BLDC GLUCOMTR-MCNC: 288 MG/DL — HIGH (ref 70–99)
GLUCOSE BLDC GLUCOMTR-MCNC: 294 MG/DL — HIGH (ref 70–99)
GLUCOSE SERPL-MCNC: 218 MG/DL — HIGH (ref 70–99)
HCT VFR BLD CALC: 24.8 % — LOW (ref 34.5–45)
HGB BLD-MCNC: 8.1 G/DL — LOW (ref 11.5–15.5)
IMM GRANULOCYTES NFR BLD AUTO: 0.4 % — SIGNIFICANT CHANGE UP (ref 0–0.9)
LYMPHOCYTES # BLD AUTO: 1.17 K/UL — SIGNIFICANT CHANGE UP (ref 1–3.3)
LYMPHOCYTES # BLD AUTO: 12.5 % — LOW (ref 13–44)
MCHC RBC-ENTMCNC: 29.1 PG — SIGNIFICANT CHANGE UP (ref 27–34)
MCHC RBC-ENTMCNC: 32.7 GM/DL — SIGNIFICANT CHANGE UP (ref 32–36)
MCV RBC AUTO: 89.2 FL — SIGNIFICANT CHANGE UP (ref 80–100)
MONOCYTES # BLD AUTO: 0.93 K/UL — HIGH (ref 0–0.9)
MONOCYTES NFR BLD AUTO: 9.9 % — SIGNIFICANT CHANGE UP (ref 2–14)
NEUTROPHILS # BLD AUTO: 7.2 K/UL — SIGNIFICANT CHANGE UP (ref 1.8–7.4)
NEUTROPHILS NFR BLD AUTO: 76.8 % — SIGNIFICANT CHANGE UP (ref 43–77)
NRBC # BLD: 0 /100 WBCS — SIGNIFICANT CHANGE UP (ref 0–0)
PLATELET # BLD AUTO: 137 K/UL — LOW (ref 150–400)
POTASSIUM SERPL-MCNC: 4 MMOL/L — SIGNIFICANT CHANGE UP (ref 3.5–5.3)
POTASSIUM SERPL-SCNC: 4 MMOL/L — SIGNIFICANT CHANGE UP (ref 3.5–5.3)
RBC # BLD: 2.78 M/UL — LOW (ref 3.8–5.2)
RBC # FLD: 13.4 % — SIGNIFICANT CHANGE UP (ref 10.3–14.5)
SODIUM SERPL-SCNC: 137 MMOL/L — SIGNIFICANT CHANGE UP (ref 135–145)
WBC # BLD: 9.38 K/UL — SIGNIFICANT CHANGE UP (ref 3.8–10.5)
WBC # FLD AUTO: 9.38 K/UL — SIGNIFICANT CHANGE UP (ref 3.8–10.5)

## 2024-07-10 PROCEDURE — 72100 X-RAY EXAM L-S SPINE 2/3 VWS: CPT | Mod: 26

## 2024-07-10 PROCEDURE — 99222 1ST HOSP IP/OBS MODERATE 55: CPT

## 2024-07-10 RX ORDER — DAPAGLIFLOZIN 10 MG/1
5 TABLET, FILM COATED ORAL DAILY
Refills: 0 | Status: DISCONTINUED | OUTPATIENT
Start: 2024-07-10 | End: 2024-07-10

## 2024-07-10 RX ORDER — SENNOSIDES 8.6 MG
2 TABLET ORAL
Qty: 14 | Refills: 0
Start: 2024-07-10 | End: 2024-07-16

## 2024-07-10 RX ORDER — OXYCODONE HYDROCHLORIDE 100 MG/5ML
1 SOLUTION ORAL
Qty: 30 | Refills: 0
Start: 2024-07-10

## 2024-07-10 RX ORDER — PREGABALIN 50 MG/1
1 CAPSULE ORAL
Qty: 21 | Refills: 0
Start: 2024-07-10 | End: 2024-07-16

## 2024-07-10 RX ORDER — ACETAMINOPHEN 325 MG
2 TABLET ORAL
Qty: 42 | Refills: 0
Start: 2024-07-10 | End: 2024-07-16

## 2024-07-10 RX ORDER — METHOCARBAMOL 500 MG
1 TABLET ORAL
Qty: 21 | Refills: 0
Start: 2024-07-10 | End: 2024-07-16

## 2024-07-10 RX ORDER — INSULIN GLARGINE 100 [IU]/ML
12 INJECTION, SOLUTION SUBCUTANEOUS AT BEDTIME
Refills: 0 | Status: DISCONTINUED | OUTPATIENT
Start: 2024-07-10 | End: 2024-07-10

## 2024-07-10 RX ADMIN — ONDANSETRON HYDROCHLORIDE 4 MILLIGRAM(S): 2 INJECTION INTRAMUSCULAR; INTRAVENOUS at 11:23

## 2024-07-10 RX ADMIN — METFORMIN HYDROCHLORIDE 1000 MILLIGRAM(S): 850 TABLET, FILM COATED ORAL at 17:12

## 2024-07-10 RX ADMIN — INSULIN LISPRO 6: 100 INJECTION, SOLUTION SUBCUTANEOUS at 17:16

## 2024-07-10 RX ADMIN — LOSARTAN POTASSIUM 25 MILLIGRAM(S): 100 TABLET, FILM COATED ORAL at 06:20

## 2024-07-10 RX ADMIN — Medication 500 MILLIGRAM(S): at 06:20

## 2024-07-10 RX ADMIN — DAPAGLIFLOZIN 5 MILLIGRAM(S): 10 TABLET, FILM COATED ORAL at 13:38

## 2024-07-10 RX ADMIN — PANTOPRAZOLE SODIUM 40 MILLIGRAM(S): 40 INJECTION, POWDER, FOR SOLUTION INTRAVENOUS at 06:20

## 2024-07-10 RX ADMIN — PREGABALIN 50 MILLIGRAM(S): 50 CAPSULE ORAL at 13:38

## 2024-07-10 RX ADMIN — POLYETHYLENE GLYCOL 3350 17 GRAM(S): 1 POWDER ORAL at 11:22

## 2024-07-10 RX ADMIN — Medication 1000 MILLIGRAM(S): at 13:37

## 2024-07-10 RX ADMIN — ONDANSETRON HYDROCHLORIDE 4 MILLIGRAM(S): 2 INJECTION INTRAMUSCULAR; INTRAVENOUS at 17:13

## 2024-07-10 RX ADMIN — INSULIN LISPRO 4: 100 INJECTION, SOLUTION SUBCUTANEOUS at 07:48

## 2024-07-10 RX ADMIN — ASPIRIN 81 MILLIGRAM(S): 325 TABLET, FILM COATED ORAL at 11:23

## 2024-07-10 RX ADMIN — PREGABALIN 50 MILLIGRAM(S): 50 CAPSULE ORAL at 06:20

## 2024-07-10 RX ADMIN — METFORMIN HYDROCHLORIDE 1000 MILLIGRAM(S): 850 TABLET, FILM COATED ORAL at 07:52

## 2024-07-10 RX ADMIN — Medication 1000 MILLIGRAM(S): at 06:18

## 2024-07-10 RX ADMIN — INSULIN LISPRO 6: 100 INJECTION, SOLUTION SUBCUTANEOUS at 12:29

## 2024-07-10 RX ADMIN — Medication 500 MILLIGRAM(S): at 13:52

## 2024-07-10 RX ADMIN — Medication 1000 MILLIGRAM(S): at 06:52

## 2024-07-10 RX ADMIN — ONDANSETRON HYDROCHLORIDE 4 MILLIGRAM(S): 2 INJECTION INTRAMUSCULAR; INTRAVENOUS at 06:20

## 2024-07-10 NOTE — CONSULT NOTE ADULT - ASSESSMENT
79F w NIDDM2 (6.9), CAD s/p PCI x3 2017, HTN, HLD here for elective L3-5 TLIF w Dr. Meraz 7/8    #Post-op state - pain controlled. PPx; SQL. On bowel regimen and incentive spirometer  #Lumbar radiculopathy   - tylenol 1g q8, robaxin 500 q8, lyrica 50 q8   - PRN: Oxycodone 5 q4 for severe pain    #CAD s/p 3 stents in 2017. Denies any chest pain   - resumed on home aspirin 81mg daily  #Acute blood loss anemia - hgb 8.1 from 8.6. Outpatient [id number]. Asymptomatic  #NIDDM2 - A1C 6.9   - Home on metformin 1000mg BID, rybelsus 3mg daily, glimepride 4mg BID, jardiance 10mg daily   - BG elevated. Needed 22u insulin ssi over last 24h  #HTN - home on losartan 25mg daily  #HLD - c/w home statin  #Obesity - BMI 30. Affects all aspects of care    Plan  Overall, pt doing well. Eating wo issues  Would resume all PO diabetes medications on discharge    DISPO: Home  Above d/w ortho

## 2024-07-10 NOTE — PROGRESS NOTE ADULT - SUBJECTIVE AND OBJECTIVE BOX
POST OPERATIVE DAY #: 2  STATUS POST: L3-L5 TLIF                     SUBJECTIVE: Patient seen and examined.   Denies any sob/cp/n/v/numbness or tingling in b/l     OBJECTIVE:     Vital Signs Last 24 Hrs  T(C): 36.9 (10 Jul 2024 09:30), Max: 37.2 (09 Jul 2024 20:33)  T(F): 98.4 (10 Jul 2024 09:30), Max: 99 (09 Jul 2024 20:33)  HR: 82 (10 Jul 2024 09:30) (74 - 96)  BP: 101/54 (10 Jul 2024 09:30) (101/54 - 124/60)  BP(mean): 86 (10 Jul 2024 05:59) (76 - 86)  RR: 18 (10 Jul 2024 09:30) (16 - 20)  SpO2: 96% (10 Jul 2024 09:30) (94% - 97%)    Parameters below as of 10 Jul 2024 09:30  Patient On (Oxygen Delivery Method): room air        Affected extremity:          Dressing: clean/dry/intact          Sensation: intact to light touch to patient's baseline         Motor exam: EHL/TA/GS   Pulses             I&O's Detail    09 Jul 2024 07:01  -  10 Jul 2024 07:00  --------------------------------------------------------  IN:    Oral Fluid: 360 mL  Total IN: 360 mL    OUT:    Drain (mL): 170 mL    Indwelling Catheter - Urethral (mL): 1800 mL    Voided (mL): 760 mL  Total OUT: 2730 mL    Total NET: -2370 mL      10 Jul 2024 07:01  -  10 Jul 2024 11:19  --------------------------------------------------------  IN:    Oral Fluid: 180 mL  Total IN: 180 mL    OUT:  Total OUT: 0 mL    Total NET: 180 mL          LABS:                        8.1    9.38  )-----------( 137      ( 10 Jul 2024 05:30 )             24.8     07-10    137  |  105  |  15  ----------------------------<  218<H>  4.0   |  22  |  0.81    Ca    9.1      10 Jul 2024 05:30        Urinalysis Basic - ( 10 Jul 2024 05:30 )    Color: x / Appearance: x / SG: x / pH: x  Gluc: 218 mg/dL / Ketone: x  / Bili: x / Urobili: x   Blood: x / Protein: x / Nitrite: x   Leuk Esterase: x / RBC: x / WBC x   Sq Epi: x / Non Sq Epi: x / Bacteria: x        MEDICATIONS:    acetaminophen     Tablet .. 1000 milliGRAM(s) Oral every 8 hours  HYDROmorphone  Injectable 0.5 milliGRAM(s) IV Push every 4 hours PRN  HYDROmorphone  Injectable 0.5 milliGRAM(s) IV Push every 15 minutes PRN  methocarbamol 500 milliGRAM(s) Oral every 8 hours  ondansetron   Disintegrating Tablet 4 milliGRAM(s) Oral every 6 hours  oxyCODONE    IR 5 milliGRAM(s) Oral every 4 hours PRN  pregabalin 50 milliGRAM(s) Oral three times a day    aspirin  chewable 81 milliGRAM(s) Oral daily  enoxaparin Injectable 40 milliGRAM(s) SubCutaneous every 24 hours        ASSESSMENT AND PLAN: 80yo Female s/p     1. Analgesic pain control  2. DVT prophylaxis: ASA      HSQ       Coumadin      Lovenox      SCDs      Other:   3. Weight Bearing Status:  Weight bearing as tolerated       NWB         Partial Weight Bearing  4. Disposition: Home          Subacute Rehab      pending PT evaluation POST OPERATIVE DAY #: 2  STATUS POST: L3-L5 TLIF by Dr. Meraz 7/8                    SUBJECTIVE: Patient seen and examined. Pt sitting comfortably at edge of bed. Pt endorses R LE pain consistent with pre-op baseline, reports some improvement. Pt denies numbness or tingling in b/l LE. Pt reports tolerating diet well. Denies BM, flatulence, abdominal pain. Denies any sob/cp/n/v.     OBJECTIVE:   Vital Signs Last 24 Hrs  T(C): 36.9 (10 Jul 2024 09:30), Max: 37.2 (09 Jul 2024 20:33)  T(F): 98.4 (10 Jul 2024 09:30), Max: 99 (09 Jul 2024 20:33)  HR: 82 (10 Jul 2024 09:30) (74 - 96)  BP: 101/54 (10 Jul 2024 09:30) (101/54 - 124/60)  BP(mean): 86 (10 Jul 2024 05:59) (76 - 86)  RR: 18 (10 Jul 2024 09:30) (16 - 20)  SpO2: 96% (10 Jul 2024 09:30) (94% - 97%)    Parameters below as of 10 Jul 2024 09:30  Patient On (Oxygen Delivery Method): room air    Affected extremity: L3-L5, b/l LE PE          Dressing: aquacel displaced due to pt movement, incision clean/dry/intact         Sensation: intact to light touch to patient's baseline         Motor exam: 5/5 EHL/TA/GS   Pulses DP 2+ b/l           I&O's Detail  09 Jul 2024 07:01  -  10 Jul 2024 07:00  --------------------------------------------------------  IN:    Oral Fluid: 360 mL  Total IN: 360 mL    OUT:    Drain (mL): 170 mL    Indwelling Catheter - Urethral (mL): 1800 mL    Voided (mL): 760 mL  Total OUT: 2730 mL    Total NET: -2370 mL    10 Jul 2024 07:01  -  10 Jul 2024 11:19  --------------------------------------------------------  IN:  Oral Fluid: 180 mL  Total IN: 180 mL    OUT:  Total OUT: 0 mL    Total NET: 180 mL    LABS:                        8.1    9.38  )-----------( 137      ( 10 Jul 2024 05:30 )             24.8     07-10    137  |  105  |  15  ----------------------------<  218<H>  4.0   |  22  |  0.81    Ca    9.1      10 Jul 2024 05:30    Urinalysis Basic - ( 10 Jul 2024 05:30 )    Color: x / Appearance: x / SG: x / pH: x  Gluc: 218 mg/dL / Ketone: x  / Bili: x / Urobili: x   Blood: x / Protein: x / Nitrite: x   Leuk Esterase: x / RBC: x / WBC x   Sq Epi: x / Non Sq Epi: x / Bacteria: x    MEDICATIONS:  acetaminophen     Tablet .. 1000 milliGRAM(s) Oral every 8 hours  HYDROmorphone  Injectable 0.5 milliGRAM(s) IV Push every 4 hours PRN  HYDROmorphone  Injectable 0.5 milliGRAM(s) IV Push every 15 minutes PRN  methocarbamol 500 milliGRAM(s) Oral every 8 hours  ondansetron   Disintegrating Tablet 4 milliGRAM(s) Oral every 6 hours  oxyCODONE    IR 5 milliGRAM(s) Oral every 4 hours PRN  pregabalin 50 milliGRAM(s) Oral three times a day  aspirin  chewable 81 milliGRAM(s) Oral daily  enoxaparin Injectable 40 milliGRAM(s) SubCutaneous every 24 hours    ASSESSMENT AND PLAN: 78yo Female POD2 s/p L3-L5 TLIF by Dr. Meraz 7/8  - Analgesic pain control  - Dressing change  - Reassess drain output 1pm  - Resume Jardiance  - Standing Xray  - DVT prophylaxis: ASA      HSQ       Coumadin      Lovenox      SCDs      Other:   - Weight Bearing Status:  Weight bearing as tolerated  - Disposition: pending PT evaluation POST OPERATIVE DAY #: 2  STATUS POST: L3-L5 TLIF by Dr. Meraz 7/8                    Patient [place] Speaking, daughter at bedside for translation.  SUBJECTIVE: Patient seen and examined. Pt sitting comfortably at edge of bed. Pt endorses R LE pain consistent with pre-op baseline, reports some improvement. Pt denies numbness or tingling in b/l LE. Pt reports tolerating diet well.  Denies any sob/cp/n/v.     OBJECTIVE:   Vital Signs Last 24 Hrs  T(C): 36.9 (10 Jul 2024 09:30), Max: 37.2 (09 Jul 2024 20:33)  T(F): 98.4 (10 Jul 2024 09:30), Max: 99 (09 Jul 2024 20:33)  HR: 82 (10 Jul 2024 09:30) (74 - 96)  BP: 101/54 (10 Jul 2024 09:30) (101/54 - 124/60)  BP(mean): 86 (10 Jul 2024 05:59) (76 - 86)  RR: 18 (10 Jul 2024 09:30) (16 - 20)  SpO2: 96% (10 Jul 2024 09:30) (94% - 97%)    Parameters below as of 10 Jul 2024 09:30  Patient On (Oxygen Delivery Method): room air    Affected extremity: L3-L5, b/l LE PE          Dressing: aquacel displaced due to pt movement, new Aquacel dressing applied. Incision healing well without erythema or drainage.         Sensation: intact to light touch to L2-S1 dermatomes bilaterally         Motor exam: 5/5 EHL/FHL/TA/GS   Pulses DP 2+ b/l           I&O's Detail  09 Jul 2024 07:01  -  10 Jul 2024 07:00  --------------------------------------------------------  IN:    Oral Fluid: 360 mL  Total IN: 360 mL    OUT:    Drain (mL): 170 mL    Indwelling Catheter - Urethral (mL): 1800 mL    Voided (mL): 760 mL  Total OUT: 2730 mL    Total NET: -2370 mL    10 Jul 2024 07:01  -  10 Jul 2024 11:19  --------------------------------------------------------  IN:  Oral Fluid: 180 mL  Total IN: 180 mL    OUT:  Total OUT: 0 mL    Total NET: 180 mL    LABS:                        8.1    9.38  )-----------( 137      ( 10 Jul 2024 05:30 )             24.8     07-10    137  |  105  |  15  ----------------------------<  218<H>  4.0   |  22  |  0.81    Ca    9.1      10 Jul 2024 05:30    Urinalysis Basic - ( 10 Jul 2024 05:30 )    Color: x / Appearance: x / SG: x / pH: x  Gluc: 218 mg/dL / Ketone: x  / Bili: x / Urobili: x   Blood: x / Protein: x / Nitrite: x   Leuk Esterase: x / RBC: x / WBC x   Sq Epi: x / Non Sq Epi: x / Bacteria: x    MEDICATIONS:  acetaminophen     Tablet .. 1000 milliGRAM(s) Oral every 8 hours  HYDROmorphone  Injectable 0.5 milliGRAM(s) IV Push every 4 hours PRN  HYDROmorphone  Injectable 0.5 milliGRAM(s) IV Push every 15 minutes PRN  methocarbamol 500 milliGRAM(s) Oral every 8 hours  ondansetron   Disintegrating Tablet 4 milliGRAM(s) Oral every 6 hours  oxyCODONE    IR 5 milliGRAM(s) Oral every 4 hours PRN  pregabalin 50 milliGRAM(s) Oral three times a day  aspirin  chewable 81 milliGRAM(s) Oral daily  enoxaparin Injectable 40 milliGRAM(s) SubCutaneous every 24 hours      ASSESSMENT AND PLAN: 78yo Female POD2 s/p L3-L5 TLIF by Dr. Meraz 7/8  - Stable, appreciate medical comanagement  - Analgesic pain control  - OOB/IS  - Bowel Regimen  - HVx1: 70/170, Reassess drain output 1pm  - Blood Glucose: 214, 360, 241. Resume Jardiance per medicine  - Standing Xray  - DVT prophylaxis: LVX and ASA   - Weight Bearing Status:  Weight bearing as tolerated  - Disposition: HPT, discharged from PT program today

## 2024-07-10 NOTE — PROGRESS NOTE ADULT - SUBJECTIVE AND OBJECTIVE BOX
Ortho Note    Pt comfortable without complaints, incisional back pain controlled  Denies CP, SOB, N/V, numbness/tingling     Vital Signs Last 24 Hrs  T(C): 36.4 (07-10-24 @ 01:15), Max: 36.4 (07-10-24 @ 01:15)  T(F): 97.5 (07-10-24 @ 01:15), Max: 97.5 (07-10-24 @ 01:15)  HR: 75 (07-10-24 @ 01:15) (75 - 75)  BP: 109/53 (07-10-24 @ 01:15) (109/53 - 109/53)  BP(mean): 76 (07-10-24 @ 01:15) (76 - 76)  RR: 16 (07-10-24 @ 01:15) (16 - 16)  SpO2: 94% (07-10-24 @ 01:15) (94% - 94%)  I&O's Summary    08 Jul 2024 07:01  -  09 Jul 2024 07:00  --------------------------------------------------------  IN: 1910 mL / OUT: 1825 mL / NET: 85 mL    09 Jul 2024 07:01  -  10 Jul 2024 06:41  --------------------------------------------------------  IN: 360 mL / OUT: 2580 mL / NET: -2220 mL        General: Pt Alert and oriented, NAD  DSG C/D/I  Pulses: 2+  Sensation: SILT  Motor: 5/5 Quad/Ham/EHL/FHL/TA/GS  HV: 40/140                        8.6    9.16  )-----------( 141      ( 09 Jul 2024 05:30 )             26.8     07-09    139  |  104  |  15  ----------------------------<  203<H>  4.2   |  24  |  0.87    Ca    8.8      09 Jul 2024 05:30        A/P: 78 y/o Female POD#2 s/p L3-L5 TLIF with Dr. Meraz  - Jc. Continue to appreciate medicine recommendations.   - due to hx of stent and age, pt was transferred to telemetry unit for cardiac monitoring today  - Restart at home metformin due to elevated blood glucose levels  - Pain Control  - DVT ppx: SCDs, Lovenox tonight, ASA  - PT, WBS: WBAT  - OOB/IS  - Bowel regimen  - Dispo: Home pending PT clearance, continue drains     Ortho Pager [phone]

## 2024-07-10 NOTE — DISCHARGE NOTE NURSING/CASE MANAGEMENT/SOCIAL WORK - NSDPACMPNY_GEN_ALL_CORE
From: Lynsey Ramirez  To: Raghav Syed MD  Sent: 5/16/2024 6:52 PM CDT  Subject: Thyroid question    Is there anything that I can do in the meantime because I don't want to keep gaining weight if that is the problem does it mean that I need to up my t3 and t4 to keep it all in balance or what would you suggest, or something that I could be put on?    Family

## 2024-07-10 NOTE — DISCHARGE NOTE NURSING/CASE MANAGEMENT/SOCIAL WORK - PATIENT PORTAL LINK FT
You can access the FollowMyHealth Patient Portal offered by [facility] by registering at the following website: [email or web address]. By joining [email or web address]’s FollowMyHealth portal, you will also be able to view your health information using other applications (apps) compatible with our system.

## 2024-07-10 NOTE — DISCHARGE NOTE NURSING/CASE MANAGEMENT/SOCIAL WORK - NSDCPEFALRISK_GEN_ALL_CORE
For information on Fall & Injury Prevention, visit: [email or web address] OR  [email or web address] OR  https://www.cdc.gov/steadi/patient.html

## 2024-07-10 NOTE — PATIENT PROFILE ADULT - FALL HARM RISK - HARM RISK INTERVENTIONS

## 2024-07-10 NOTE — CONSULT NOTE ADULT - SUBJECTIVE AND OBJECTIVE BOX
HPI "79F c/o back pain x chronic. Pt denies acute preceding trauma/injury. Pt has failed conservative management for her low back pain including multiple spinal epidurals (last one in 2023) and trigger point injections. She denies numbness/tingling of bilateral lower extremities. She uses a walker to get around at baseline. Denies DVT hx; denies CP, SOB, N/V, tactile fevers, calf pain.       Present for elective L3-L5 Transforaminal Lumbar Interbody Fusion     Grandson at bedside providing [place] translation (03 Jul 2024 09:32)"    Pt's daughter serving as ; phone  was offered  79F w NIDDM2 (6.9), CAD s/p PCI x3 2017, HTN, HLD here for elective L3-5 TLIF w Dr. Meraz 7/8    Pt reports pain is controlled - no numbness in BLE. Reports eating well - no N/V/abd pain. +flatus. Denies any fever, chest pain. No LH/dizziness when mobilizing. No dysuria. Eager to return home  Discussed elevated glucose and A1C at target    ROS: 12 point ROS reviewed and otherwise negative per HPI  FH: No DVT/PE   SH: Non smoker        PAST MEDICAL & SURGICAL HISTORY:  Back pain      HTN (hypertension)      HLD (hyperlipidemia)      DM (diabetes mellitus)      CAD (coronary artery disease)      H/O: hysterectomy      Coronary stent patent  x3 2017        Home Meds: Home Medications:  Aspir 81 oral delayed release tablet: 1 tab(s) orally once a day (05 Jul 2024 12:22)  Dexilant 60 mg oral delayed release capsule: 1 cap(s) orally once a day (08 Jul 2024 06:21)  glimepiride 4 mg oral tablet: 1 tab(s) orally 2 times a day (08 Jul 2024 06:21)  Jardiance 10 mg oral tablet: 1 tab(s) orally once a day (05 Jul 2024 12:22)  losartan 25 mg oral tablet: 1 tab(s) orally once a day (08 Jul 2024 06:21)  metFORMIN 1000 mg oral tablet: 1 tab(s) orally 2 times a day (08 Jul 2024 06:21)  rosuvastatin 20 mg oral tablet: 1 tab(s) orally once a day (08 Jul 2024 06:21)  Rybelsus 3 mg oral tablet: 1 tab(s) orally once a day (05 Jul 2024 12:22)  Vascepa 1 g oral capsule: 2 cap(s) orally 2 times a day (08 Jul 2024 06:21)    Allergies: Allergies    No Known Allergies    Intolerances      Soc:   Advanced Directives: Presumed Full Code     CURRENT MEDICATIONS:   --------------------------------------------------------------------------------------  Neurologic Medications  acetaminophen     Tablet .. 1000 milliGRAM(s) Oral every 8 hours  HYDROmorphone  Injectable 0.5 milliGRAM(s) IV Push every 15 minutes PRN breakthrough pain in PACU  HYDROmorphone  Injectable 0.5 milliGRAM(s) IV Push every 4 hours PRN breakthrough pain on floor  methocarbamol 500 milliGRAM(s) Oral every 8 hours  ondansetron   Disintegrating Tablet 4 milliGRAM(s) Oral every 6 hours  oxyCODONE    IR 5 milliGRAM(s) Oral every 4 hours PRN Severe Pain (7 - 10)  pregabalin 50 milliGRAM(s) Oral three times a day    Respiratory Medications    Cardiovascular Medications  losartan 25 milliGRAM(s) Oral daily    Gastrointestinal Medications  dextrose 10% Bolus 125 milliLiter(s) IV Bolus once  dextrose 5%. 1000 milliLiter(s) IV Continuous <Continuous>  dextrose 5%. 1000 milliLiter(s) IV Continuous <Continuous>  lactated ringers. 1000 milliLiter(s) IV Continuous <Continuous>  pantoprazole    Tablet 40 milliGRAM(s) Oral before breakfast  polyethylene glycol 3350 17 Gram(s) Oral daily  senna 2 Tablet(s) Oral at bedtime    Genitourinary Medications    Hematologic/Oncologic Medications  aspirin  chewable 81 milliGRAM(s) Oral daily  enoxaparin Injectable 40 milliGRAM(s) SubCutaneous every 24 hours    Antimicrobial/Immunologic Medications    Endocrine/Metabolic Medications  atorvastatin 80 milliGRAM(s) Oral at bedtime  dapagliflozin 5 milliGRAM(s) Oral daily  dextrose 50% Injectable 12.5 Gram(s) IV Push once  dextrose 50% Injectable 25 Gram(s) IV Push once  dextrose Oral Gel 15 Gram(s) Oral once PRN Blood Glucose LESS THAN 70 milliGRAM(s)/deciliter  glucagon  Injectable 1 milliGRAM(s) IntraMuscular once  insulin glargine Injectable (LANTUS) 12 Unit(s) SubCutaneous at bedtime  insulin lispro (ADMELOG) corrective regimen sliding scale   SubCutaneous Before meals and at bedtime  metFORMIN 1000 milliGRAM(s) Oral two times a day    Topical/Other Medications    --------------------------------------------------------------------------------------    VITAL SIGNS, INS/OUTS (last 24 hours):  --------------------------------------------------------------------------------------  ICU Vital Signs Last 24 Hrs  T(C): 37.6 (10 Jul 2024 13:37), Max: 37.6 (10 Jul 2024 13:37)  T(F): 99.7 (10 Jul 2024 13:37), Max: 99.7 (10 Jul 2024 13:37)  HR: 93 (10 Jul 2024 13:37) (74 - 93)  BP: 127/58 (10 Jul 2024 13:37) (101/54 - 127/58)  BP(mean): 86 (10 Jul 2024 05:59) (76 - 86)  ABP: --  ABP(mean): --  RR: 18 (10 Jul 2024 13:37) (16 - 18)  SpO2: 96% (10 Jul 2024 13:37) (94% - 97%)    O2 Parameters below as of 10 Jul 2024 13:37  Patient On (Oxygen Delivery Method): room air          I&O's Summary    09 Jul 2024 07:01  -  10 Jul 2024 07:00  --------------------------------------------------------  IN: 360 mL / OUT: 2730 mL / NET: -2370 mL    10 Jul 2024 07:01  -  10 Jul 2024 18:26  --------------------------------------------------------  IN: 180 mL / OUT: 450 mL / NET: -270 mL      --------------------------------------------------------------------------------------    EXAM:  GEN: female in NAD on RA  HEENT: NC/AT, MMM  CV: RRR, nml S1S2, no murmurs  PULM: nml effort, CTAB  ABD: Soft, non-distended, NABS, non-tender  NEURO  A/O x3, moving all extremities, Sensation intact, lumbar dressing c/d/i. 5/5 in BLE  PSYCH: Appropriate      LABS  --------------------------------------------------------------------------------------  Labs:  CAPILLARY BLOOD GLUCOSE      POCT Blood Glucose.: 288 mg/dL (10 Jul 2024 17:11)  POCT Blood Glucose.: 294 mg/dL (10 Jul 2024 11:56)  POCT Blood Glucose.: 214 mg/dL (10 Jul 2024 07:17)  POCT Blood Glucose.: 360 mg/dL (09 Jul 2024 21:46)                          8.1    9.38  )-----------( 137      ( 10 Jul 2024 05:30 )             24.8       Auto Neutrophil %: 76.8 % (07-10-24 @ 05:30)  Auto Immature Granulocyte %: 0.4 % (07-10-24 @ 05:30)    07-10    137  |  105  |  15  ----------------------------<  218<H>  4.0   |  22  |  0.81      Calcium: 9.1 mg/dL (07-10-24 @ 05:30)      LFTs:       ABG - ( 08 Jul 2024 10:40 )  pH: 7.38  /  pCO2: 37    /  pO2: 390   / HCO3: 22    / Base Excess: -2.9  /  SaO2: x               ABG - ( 08 Jul 2024 08:56 )  pH: 7.38  /  pCO2: 33    /  pO2: 365   / HCO3: 20    / Base Excess: -5.0  /  SaO2: x                 Coags:            Urinalysis Basic - ( 10 Jul 2024 05:30 )    Color: x / Appearance: x / SG: x / pH: x  Gluc: 218 mg/dL / Ketone: x  / Bili: x / Urobili: x   Blood: x / Protein: x / Nitrite: x   Leuk Esterase: x / RBC: x / WBC x   Sq Epi: x / Non Sq Epi: x / Bacteria: x          --------------------------------------------------------------------------------------    OTHER LABS    IMAGING RESULTS  ****************

## 2024-07-17 ENCOUNTER — APPOINTMENT (OUTPATIENT)
Dept: ORTHOPEDIC SURGERY | Facility: CLINIC | Age: 79
End: 2024-07-17
Payer: MEDICARE

## 2024-07-17 DIAGNOSIS — Z90.710 ACQUIRED ABSENCE OF BOTH CERVIX AND UTERUS: ICD-10-CM

## 2024-07-17 DIAGNOSIS — I25.10 ATHEROSCLEROTIC HEART DISEASE OF NATIVE CORONARY ARTERY WITHOUT ANGINA PECTORIS: ICD-10-CM

## 2024-07-17 DIAGNOSIS — I10 ESSENTIAL (PRIMARY) HYPERTENSION: ICD-10-CM

## 2024-07-17 DIAGNOSIS — M43.16 SPONDYLOLISTHESIS, LUMBAR REGION: ICD-10-CM

## 2024-07-17 DIAGNOSIS — Z79.84 LONG TERM (CURRENT) USE OF ORAL HYPOGLYCEMIC DRUGS: ICD-10-CM

## 2024-07-17 DIAGNOSIS — Z95.5 PRESENCE OF CORONARY ANGIOPLASTY IMPLANT AND GRAFT: ICD-10-CM

## 2024-07-17 DIAGNOSIS — E78.5 HYPERLIPIDEMIA, UNSPECIFIED: ICD-10-CM

## 2024-07-17 DIAGNOSIS — Z79.82 LONG TERM (CURRENT) USE OF ASPIRIN: ICD-10-CM

## 2024-07-17 DIAGNOSIS — Z79.899 OTHER LONG TERM (CURRENT) DRUG THERAPY: ICD-10-CM

## 2024-07-17 DIAGNOSIS — E66.9 OBESITY, UNSPECIFIED: ICD-10-CM

## 2024-07-17 DIAGNOSIS — M48.062 SPINAL STENOSIS, LUMBAR REGION WITH NEUROGENIC CLAUDICATION: ICD-10-CM

## 2024-07-17 DIAGNOSIS — M54.16 RADICULOPATHY, LUMBAR REGION: ICD-10-CM

## 2024-07-17 DIAGNOSIS — E11.65 TYPE 2 DIABETES MELLITUS WITH HYPERGLYCEMIA: ICD-10-CM

## 2024-07-17 PROBLEM — E11.9 TYPE 2 DIABETES MELLITUS WITHOUT COMPLICATIONS: Chronic | Status: ACTIVE | Noted: 2024-07-05

## 2024-07-17 PROBLEM — M54.9 DORSALGIA, UNSPECIFIED: Chronic | Status: ACTIVE | Noted: 2024-07-03

## 2024-07-17 PROCEDURE — 99024 POSTOP FOLLOW-UP VISIT: CPT

## 2024-07-17 PROCEDURE — 72100 X-RAY EXAM L-S SPINE 2/3 VWS: CPT

## 2024-07-17 RX ORDER — METHOCARBAMOL 500 MG/1
500 TABLET, FILM COATED ORAL 3 TIMES DAILY
Qty: 42 | Refills: 1 | Status: ACTIVE | COMMUNITY
Start: 2024-07-17 | End: 1900-01-01

## 2024-07-17 RX ORDER — PREGABALIN 100 MG/1
100 CAPSULE ORAL
Qty: 90 | Refills: 0 | Status: ACTIVE | COMMUNITY
Start: 2024-07-17 | End: 1900-01-01

## 2024-07-31 ENCOUNTER — APPOINTMENT (OUTPATIENT)
Dept: ORTHOPEDIC SURGERY | Facility: CLINIC | Age: 79
End: 2024-07-31
Payer: MEDICARE

## 2024-07-31 DIAGNOSIS — M48.062 SPINAL STENOSIS, LUMBAR REGION WITH NEUROGENIC CLAUDICATION: ICD-10-CM

## 2024-07-31 DIAGNOSIS — M54.16 RADICULOPATHY, LUMBAR REGION: ICD-10-CM

## 2024-07-31 DIAGNOSIS — M43.16 SPONDYLOLISTHESIS, LUMBAR REGION: ICD-10-CM

## 2024-07-31 PROCEDURE — 72100 X-RAY EXAM L-S SPINE 2/3 VWS: CPT

## 2024-07-31 PROCEDURE — 99024 POSTOP FOLLOW-UP VISIT: CPT

## 2024-07-31 NOTE — HISTORY OF PRESENT ILLNESS
[3] : the patient reports pain that is 3/10 in severity [Chills] : no chills [Constipation] : no constipation [Diarrhea] : no diarrhea [Dysuria] : no dysuria [Fever] : no fever [Nausea] : no nausea [Vomiting] : no vomiting [de-identified] : Post op check s/p L3-L5 TLIF (7/8/24) [de-identified] : 80 y/o female presenting for follow up s/p L3-L5 TLIF (7/8/24). Patient has been progressing well in post operative course. Her pain is mild and is controlled with tylenol, lyrica, and methocarbamol. Patient had a mechanical fall today when going to bathroom. Her legs buckled and she fell straight onto her knees.  No head trauma or LOC. [de-identified] :  MSK: Lumbar spine: Incision healing well, c/d/i sutures removed   NEURO EXAM: Sensation Left L2  -  2/2            Left L3  -  2/2 Left L4  -  2/2 Left L5  -  2/2 Left S1  -  2/2   Right L2  -  2/2            Right L3  -  2/2 Right L4  -  2/2 Right L5  -  2/2 Right S1  -  2/2   Motor: Left L2 (hip flexion)                            5/5                Left L3 (knee extension)                   5/5                Left L4 (ankle dorsiflexion)                 5/5                Left L5 (long toe extensor)                5/5                Left S1 (ankle plantar flexion)           5/5   Right L2 (hip flexion)                            5/5                Right L3 (knee extension)                   5/5                Right L4 (ankle dorsiflexion)                 5/5                Right L5 (long toe extensor)                5/5                Right S1 (ankle plantar flexion)           5/5 [de-identified] : I ordered radiographs to evaluate the patient's symptoms. Lumbar 2 view radiographs taken in the office today show no dislocation or fracture.  Lumbar spondylosis.  No instability on dynamic series. TLIF hardware in proper position and alignment.  [de-identified] : 78 y/o female presenting for follow up s/p L3-L5 TLIF (7/8/24). Patient has been progressing well in post operative course. Her pain is mild and is controlled with tylenol, lyrica, and methocarbamol. Patient had a mechanical fall today when going to bathroom. Her legs buckled and she fell straight onto her knees.  No head trauma or LOC.  [de-identified] : Continue no lifting >10lbs. No twisting. No bending. No submerging incision in water. The patient was given a referral for physical therapy. F/U in 4 weeks. We discussed red flag symptoms that would require emergent evaluation. She knows to call with any questions or concerns or if her symptoms acutely worsen.

## 2024-08-28 ENCOUNTER — APPOINTMENT (OUTPATIENT)
Dept: ORTHOPEDIC SURGERY | Facility: CLINIC | Age: 79
End: 2024-08-28

## 2024-08-28 ENCOUNTER — TRANSCRIPTION ENCOUNTER (OUTPATIENT)
Age: 79
End: 2024-08-28

## 2024-08-28 DIAGNOSIS — M54.50 LOW BACK PAIN, UNSPECIFIED: ICD-10-CM

## 2024-08-28 PROCEDURE — 72100 X-RAY EXAM L-S SPINE 2/3 VWS: CPT

## 2024-08-28 PROCEDURE — 99024 POSTOP FOLLOW-UP VISIT: CPT

## 2024-08-28 RX ORDER — PREGABALIN 100 MG/1
100 CAPSULE ORAL
Qty: 60 | Refills: 1 | Status: ACTIVE | COMMUNITY
Start: 2024-08-28 | End: 1900-01-01

## 2024-08-28 NOTE — HISTORY OF PRESENT ILLNESS
[3] : the patient reports pain that is 3/10 in severity [Chills] : no chills [Constipation] : no constipation [Diarrhea] : no diarrhea [Dysuria] : no dysuria [Fever] : no fever [Nausea] : no nausea [Vomiting] : no vomiting [de-identified] : Post op check s/p L3-L5 TLIF (7/8/24) [de-identified] : 80 y/o female presenting for follow up s/p L3-L5 TLIF (7/8/24). Patient has been progressing well in post operative course. Her pain is mild and is controlled with tylenol, lyrica, and methocarbamol. She notes some pain in her left leg with prolonged activity. She is accompanied by her daughter who states her mother's mobility has greatly improved. She just started working with home PT.  [de-identified] :  MSK: Lumbar spine: Incision well healed.  NEURO EXAM: Sensation Left L2  -  2/2            Left L3  -  2/2 Left L4  -  2/2 Left L5  -  2/2 Left S1  -  2/2   Right L2  -  2/2            Right L3  -  2/2 Right L4  -  2/2 Right L5  -  2/2 Right S1  -  2/2   Motor: Left L2 (hip flexion)                            5/5                Left L3 (knee extension)                   5/5                Left L4 (ankle dorsiflexion)                 5/5                Left L5 (long toe extensor)                5/5                Left S1 (ankle plantar flexion)           5/5   Right L2 (hip flexion)                            5/5                Right L3 (knee extension)                   5/5                Right L4 (ankle dorsiflexion)                 5/5                Right L5 (long toe extensor)                5/5                Right S1 (ankle plantar flexion)           5/5 [de-identified] : I ordered radiographs to evaluate the patient's symptoms. Lumbar 2 view radiographs taken in the office today show no dislocation or fracture.  Lumbar spondylosis.  No instability on dynamic series. TLIF hardware in proper position and alignment.  [de-identified] : 80 y/o female presenting for follow up s/p L3-L5 TLIF (7/8/24). Patient has been progressing well in post operative course. Her pain is mild and is controlled with tylenol, lyrica, and methocarbamol. She notes some pain in her left leg with prolonged activity. She is accompanied by her daughter who states her mother's mobility has greatly improved. She just started working with home PT.  [de-identified] : Discussed increasing activity as tolerated. The patient was given a referral for physical therapy. Continue PT. Continue medications PRN. F/U in 2 months. We discussed red flag symptoms that would require emergent evaluation. She knows to call with any questions or concerns or if her symptoms acutely worsen.

## 2024-08-28 NOTE — END OF VISIT
[FreeTextEntry3] :  I, Dr. Meraz personally performed the evaluation and management services for this established patient who presents today with (a) new problem(s)/exacerbation of (an) existing condition(s). That E/M includes conducting the clinically appropriate interval history &/or exam, assessing all new/exacerbated conditions, and establishing a new plan of care. Today, my CARMEN, Millie Hernandez was here to observe my evaluation and management service for this new problem/exacerbated condition and follow the plan of care established by me going forward.

## 2024-09-29 PROCEDURE — 83036 HEMOGLOBIN GLYCOSYLATED A1C: CPT

## 2024-09-29 PROCEDURE — 86900 BLOOD TYPING SEROLOGIC ABO: CPT

## 2024-09-29 PROCEDURE — 86901 BLOOD TYPING SEROLOGIC RH(D): CPT

## 2024-09-29 PROCEDURE — 86850 RBC ANTIBODY SCREEN: CPT

## 2024-09-29 PROCEDURE — C1713: CPT

## 2024-09-29 PROCEDURE — P9045: CPT

## 2024-09-29 PROCEDURE — 97116 GAIT TRAINING THERAPY: CPT

## 2024-09-29 PROCEDURE — 80048 BASIC METABOLIC PNL TOTAL CA: CPT

## 2024-09-29 PROCEDURE — 76000 FLUOROSCOPY <1 HR PHYS/QHP: CPT

## 2024-09-29 PROCEDURE — 97165 OT EVAL LOW COMPLEX 30 MIN: CPT

## 2024-09-29 PROCEDURE — 36430 TRANSFUSION BLD/BLD COMPNT: CPT

## 2024-09-29 PROCEDURE — 72100 X-RAY EXAM L-S SPINE 2/3 VWS: CPT

## 2024-09-29 PROCEDURE — 82947 ASSAY GLUCOSE BLOOD QUANT: CPT

## 2024-09-29 PROCEDURE — C1889: CPT

## 2024-09-29 PROCEDURE — 85025 COMPLETE CBC W/AUTO DIFF WBC: CPT

## 2024-09-29 PROCEDURE — C9399: CPT

## 2024-09-29 PROCEDURE — 97162 PT EVAL MOD COMPLEX 30 MIN: CPT

## 2024-09-29 PROCEDURE — 36415 COLL VENOUS BLD VENIPUNCTURE: CPT

## 2024-09-29 PROCEDURE — 84132 ASSAY OF SERUM POTASSIUM: CPT

## 2024-09-29 PROCEDURE — 84295 ASSAY OF SERUM SODIUM: CPT

## 2024-09-29 PROCEDURE — 82962 GLUCOSE BLOOD TEST: CPT

## 2024-09-29 PROCEDURE — 86923 COMPATIBILITY TEST ELECTRIC: CPT

## 2024-09-29 PROCEDURE — 82805 BLOOD GASES W/O2 SATURATION: CPT

## 2024-09-29 PROCEDURE — 97530 THERAPEUTIC ACTIVITIES: CPT

## 2024-09-29 PROCEDURE — P9016: CPT

## 2024-09-29 PROCEDURE — 82330 ASSAY OF CALCIUM: CPT

## 2024-10-29 ENCOUNTER — APPOINTMENT (OUTPATIENT)
Dept: ORTHOPEDIC SURGERY | Facility: CLINIC | Age: 79
End: 2024-10-29
Payer: MEDICARE

## 2024-10-29 DIAGNOSIS — M54.16 RADICULOPATHY, LUMBAR REGION: ICD-10-CM

## 2024-10-29 DIAGNOSIS — M43.16 SPONDYLOLISTHESIS, LUMBAR REGION: ICD-10-CM

## 2024-10-29 DIAGNOSIS — M48.062 SPINAL STENOSIS, LUMBAR REGION WITH NEUROGENIC CLAUDICATION: ICD-10-CM

## 2024-10-29 PROCEDURE — 99214 OFFICE O/P EST MOD 30 MIN: CPT

## (undated) DEVICE — WOUND IRR SURGIPHOR

## (undated) DEVICE — SUT VICRYL 0 18" CT-1 UNDYED (POP-OFF)

## (undated) DEVICE — SUT MONOCRYL 3-0 18" PS-1

## (undated) DEVICE — PACK SPINE

## (undated) DEVICE — BUR STRYKER MULTI FLUTE ROUND 5MM

## (undated) DEVICE — NDL HYPO SAFE 22G X 1.5" (BLACK)

## (undated) DEVICE — WARMING BLANKET UPPER ADULT

## (undated) DEVICE — BUR STRYKER MULTI FLUTE ROUND 3MM

## (undated) DEVICE — STRYKER BONE MILL & MEDIUM BLADE

## (undated) DEVICE — SPONGE SURGICAL STRIP 1/4 X 6"

## (undated) DEVICE — DRAPE INSTRUMENT POUCH 6.75" X 11"

## (undated) DEVICE — DRAPE 3/4 SHEET 52X76"

## (undated) DEVICE — ELCTR STRYKER NEPTUNE SMOKE EVACUATION PENCIL (GREEN)

## (undated) DEVICE — ARTERIOCYTE MAGELLAN MAR0MAX CONVENIENCE QUCK DRAW

## (undated) DEVICE — SUCTION YANKAUER NO CONTROL VENT

## (undated) DEVICE — DRAPE 1/2 SHEET 40X57"

## (undated) DEVICE — SPONGE PEANUT AUTO COUNT

## (undated) DEVICE — VENODYNE/SCD SLEEVE CALF MEDIUM

## (undated) DEVICE — SPONGE SURGICAL STRIP 1/2 X 6"

## (undated) DEVICE — ELCTR AQUAMANTYS BIPOLAR SEALER 6.0

## (undated) DEVICE — STRYKER BONE MILL BLADE FINE 3.2MM

## (undated) DEVICE — DRAPE GENERAL ENDOSCOPY

## (undated) DEVICE — SUT VICRYL PLUS 2-0 18" CT-2 (POP-OFF)

## (undated) DEVICE — FOLEY TRAY 16FR LF URINE METER SURESTEP

## (undated) DEVICE — COVER BACK TABLE STRL 80/110IN 10/CA

## (undated) DEVICE — NDL CHRONOS BMA 11GA